# Patient Record
Sex: MALE | Race: WHITE | NOT HISPANIC OR LATINO | URBAN - METROPOLITAN AREA
[De-identification: names, ages, dates, MRNs, and addresses within clinical notes are randomized per-mention and may not be internally consistent; named-entity substitution may affect disease eponyms.]

---

## 2018-11-24 ENCOUNTER — INPATIENT (INPATIENT)
Facility: HOSPITAL | Age: 83
LOS: 5 days | Discharge: NURSING, REHAB OR LONG-TERM CARE | End: 2018-11-30
Attending: INTERNAL MEDICINE | Admitting: INTERNAL MEDICINE
Payer: MEDICARE

## 2018-11-24 PROCEDURE — 99285 EMERGENCY DEPT VISIT HI MDM: CPT

## 2018-11-24 PROCEDURE — 92941 PRQ TRLML REVSC TOT OCCL AMI: CPT | Mod: LD

## 2018-11-24 PROCEDURE — 93458 L HRT ARTERY/VENTRICLE ANGIO: CPT | Mod: 26,XU

## 2018-11-24 PROCEDURE — 71045 X-RAY EXAM CHEST 1 VIEW: CPT | Mod: 26

## 2018-11-25 PROCEDURE — 99223 1ST HOSP IP/OBS HIGH 75: CPT

## 2018-11-26 PROCEDURE — 99232 SBSQ HOSP IP/OBS MODERATE 35: CPT

## 2018-11-27 PROCEDURE — 99232 SBSQ HOSP IP/OBS MODERATE 35: CPT

## 2018-11-28 PROCEDURE — 99233 SBSQ HOSP IP/OBS HIGH 50: CPT

## 2018-11-28 PROCEDURE — 93306 TTE W/DOPPLER COMPLETE: CPT | Mod: 26

## 2018-11-28 PROCEDURE — 71045 X-RAY EXAM CHEST 1 VIEW: CPT | Mod: 26

## 2018-11-29 PROCEDURE — 99232 SBSQ HOSP IP/OBS MODERATE 35: CPT

## 2018-11-30 PROCEDURE — 99232 SBSQ HOSP IP/OBS MODERATE 35: CPT

## 2018-12-05 ENCOUNTER — APPOINTMENT (OUTPATIENT)
Dept: CARDIOLOGY | Facility: CLINIC | Age: 83
End: 2018-12-05
Payer: MEDICARE

## 2018-12-05 ENCOUNTER — NON-APPOINTMENT (OUTPATIENT)
Age: 83
End: 2018-12-05

## 2018-12-05 VITALS — SYSTOLIC BLOOD PRESSURE: 76 MMHG | DIASTOLIC BLOOD PRESSURE: 60 MMHG

## 2018-12-05 VITALS — HEIGHT: 69 IN | BODY MASS INDEX: 26.51 KG/M2 | OXYGEN SATURATION: 93 % | HEART RATE: 51 BPM | WEIGHT: 179 LBS

## 2018-12-05 DIAGNOSIS — G89.29 DORSALGIA, UNSPECIFIED: ICD-10-CM

## 2018-12-05 DIAGNOSIS — I25.2 OLD MYOCARDIAL INFARCTION: ICD-10-CM

## 2018-12-05 DIAGNOSIS — I11.0 HYPERTENSIVE HEART DISEASE WITH HEART FAILURE: ICD-10-CM

## 2018-12-05 DIAGNOSIS — E78.00 PURE HYPERCHOLESTEROLEMIA, UNSPECIFIED: ICD-10-CM

## 2018-12-05 DIAGNOSIS — M62.81 MUSCLE WEAKNESS (GENERALIZED): ICD-10-CM

## 2018-12-05 DIAGNOSIS — Z87.11 PERSONAL HISTORY OF PEPTIC ULCER DISEASE: ICD-10-CM

## 2018-12-05 DIAGNOSIS — K21.9 GASTRO-ESOPHAGEAL REFLUX DISEASE W/OUT ESOPHAGITIS: ICD-10-CM

## 2018-12-05 DIAGNOSIS — M54.9 DORSALGIA, UNSPECIFIED: ICD-10-CM

## 2018-12-05 DIAGNOSIS — Z78.9 OTHER SPECIFIED HEALTH STATUS: ICD-10-CM

## 2018-12-05 DIAGNOSIS — R33.9 RETENTION OF URINE, UNSPECIFIED: ICD-10-CM

## 2018-12-05 DIAGNOSIS — Z86.39 PERSONAL HISTORY OF OTHER ENDOCRINE, NUTRITIONAL AND METABOLIC DISEASE: ICD-10-CM

## 2018-12-05 DIAGNOSIS — I50.23 HYPERTENSIVE HEART DISEASE WITH HEART FAILURE: ICD-10-CM

## 2018-12-05 PROCEDURE — 93000 ELECTROCARDIOGRAM COMPLETE: CPT

## 2018-12-05 PROCEDURE — 99215 OFFICE O/P EST HI 40 MIN: CPT

## 2018-12-05 RX ORDER — TERAZOSIN 5 MG/1
5 CAPSULE ORAL
Refills: 0 | Status: ACTIVE | COMMUNITY

## 2018-12-05 RX ORDER — ASPIRIN 81 MG
81 TABLET, DELAYED RELEASE (ENTERIC COATED) ORAL DAILY
Refills: 0 | Status: ACTIVE | COMMUNITY

## 2018-12-05 RX ORDER — CLOPIDOGREL BISULFATE 75 MG/1
75 TABLET, FILM COATED ORAL
Refills: 0 | Status: ACTIVE | COMMUNITY

## 2018-12-05 NOTE — PHYSICAL EXAM
[General Appearance - Well Developed] : well developed [Normal Appearance] : normal appearance [Well Groomed] : well groomed [General Appearance - Well Nourished] : well nourished [No Deformities] : no deformities [General Appearance - In No Acute Distress] : no acute distress [Normal Conjunctiva] : the conjunctiva exhibited no abnormalities [Eyelids - No Xanthelasma] : the eyelids demonstrated no xanthelasmas [Normal Oral Mucosa] : normal oral mucosa [No Oral Pallor] : no oral pallor [No Oral Cyanosis] : no oral cyanosis [Normal Jugular Venous A Waves Present] : normal jugular venous A waves present [Normal Jugular Venous V Waves Present] : normal jugular venous V waves present [No Jugular Venous Sage A Waves] : no jugular venous sage A waves [Respiration, Rhythm And Depth] : normal respiratory rhythm and effort [Exaggerated Use Of Accessory Muscles For Inspiration] : no accessory muscle use [Auscultation Breath Sounds / Voice Sounds] : lungs were clear to auscultation bilaterally [Heart Sounds] : normal S1 and S2 [Murmurs] : no murmurs present [Irregularly Irregular] : the rhythm was irregularly irregular [Bowel Sounds] : normal bowel sounds [Abdomen Soft] : soft [Abdomen Tenderness] : non-tender [Abdomen Mass (___ Cm)] : no abdominal mass palpated [Abnormal Walk] : normal gait [Gait - Sufficient For Exercise Testing] : the gait was sufficient for exercise testing [Nail Clubbing] : no clubbing of the fingernails [Cyanosis, Localized] : no localized cyanosis [Petechial Hemorrhages (___cm)] : no petechial hemorrhages [] : no ischemic changes [Oriented To Time, Place, And Person] : oriented to person, place, and time [Affect] : the affect was normal [Mood] : the mood was normal [No Anxiety] : not feeling anxious [FreeTextEntry1] : ecchymoses

## 2018-12-05 NOTE — DISCUSSION/SUMMARY
[FreeTextEntry1] : 1) Because of his bradycardia, and hypotension, I reduced his Toprol to 25 mg q.d. and reduced his Lasix to 40 mg every other day.\par 2) I will see him again in  2 weeks but if his bradycardia remains he may require pacemaker

## 2018-12-05 NOTE — REASON FOR VISIT
[Follow-Up - From Hospitalization] : follow-up of a recent hospitalization for [Abnormal ECG] : an abnormal ECG [Cardiomyopathy] : cardiomyopathy [Coronary Artery Disease] : coronary artery disease [Fatigue] : feeling tired (fatigue) [Heart Failure] : congestive heart failure [Hyperlipidemia] : hyperlipidemia [FreeTextEntry1] : I saw this 83-year-old man in followup consultation on  12/05/18\par 2 weeks ago he presented to the hospital with an anterior wall myocardial infarction and had a stent procedure performed.\par He did well post procedure but did develop congestive heart failure in the hospital. He also developed atrial fibrillation.\par He is recovering well in cardiac rehabilitation, but has significant bradycardia and hypotension although he is not aware of this.\par He is asymptomatic

## 2018-12-26 ENCOUNTER — APPOINTMENT (OUTPATIENT)
Dept: CARDIOLOGY | Facility: CLINIC | Age: 83
End: 2018-12-26
Payer: MEDICARE

## 2018-12-26 ENCOUNTER — NON-APPOINTMENT (OUTPATIENT)
Age: 83
End: 2018-12-26

## 2018-12-26 VITALS
BODY MASS INDEX: 26.96 KG/M2 | HEIGHT: 69 IN | OXYGEN SATURATION: 93 % | WEIGHT: 182 LBS | SYSTOLIC BLOOD PRESSURE: 118 MMHG | HEART RATE: 80 BPM | DIASTOLIC BLOOD PRESSURE: 74 MMHG

## 2018-12-26 PROCEDURE — 99215 OFFICE O/P EST HI 40 MIN: CPT

## 2018-12-26 PROCEDURE — 93000 ELECTROCARDIOGRAM COMPLETE: CPT

## 2018-12-26 NOTE — DISCUSSION/SUMMARY
[FreeTextEntry1] : 1) Because of his bradycardia, and hypotension, I reduced his Toprol to 25 mg q.d. and reduced his Lasix to 40 mg 3X /week\par 2) I will see him again in  4 weeks but if his bradycardia remains he may require pacemaker\par 3) He will get an echo and holter monitor

## 2018-12-26 NOTE — REASON FOR VISIT
[Follow-Up - Clinic] : a clinic follow-up of [Abnormal ECG] : an abnormal ECG [Cardiomyopathy] : cardiomyopathy [Coronary Artery Disease] : coronary artery disease [Fatigue] : feeling tired (fatigue) [Heart Failure] : congestive heart failure [Hyperlipidemia] : hyperlipidemia [FreeTextEntry1] : I saw this 83-year-old man in followup consultation on  12/26/18\par 5 weeks ago he presented to the hospital with an anterior wall myocardial infarction and had a stent procedure performed.\par He did well post procedure but did develop congestive heart failure in the hospital. He also developed atrial fibrillation.\par He  recovered well in cardiac rehabilitation and is now home., I had reduced his meds.\par He is asymptomatic but complains of fatigue.\par He is still wearing a Vest

## 2018-12-26 NOTE — HISTORY OF PRESENT ILLNESS
[FreeTextEntry1] : he has no chest pain\par He has some shortness of breath\par He has no palpitations\par He has no syncope\par He is neurologically intact\par He has no edema\par He has no GI symptoms\par

## 2018-12-31 PROCEDURE — 93224 XTRNL ECG REC UP TO 48 HRS: CPT

## 2019-01-04 ENCOUNTER — APPOINTMENT (OUTPATIENT)
Dept: CARDIOLOGY | Facility: CLINIC | Age: 84
End: 2019-01-04
Payer: MEDICARE

## 2019-01-04 PROCEDURE — 93308 TTE F-UP OR LMTD: CPT

## 2019-01-17 ENCOUNTER — APPOINTMENT (OUTPATIENT)
Dept: ELECTROPHYSIOLOGY | Facility: CLINIC | Age: 84
End: 2019-01-17

## 2019-02-05 ENCOUNTER — APPOINTMENT (OUTPATIENT)
Dept: CARDIOLOGY | Facility: CLINIC | Age: 84
End: 2019-02-05
Payer: MEDICARE

## 2019-02-05 VITALS
HEIGHT: 69 IN | BODY MASS INDEX: 26.96 KG/M2 | SYSTOLIC BLOOD PRESSURE: 114 MMHG | OXYGEN SATURATION: 93 % | WEIGHT: 182 LBS | DIASTOLIC BLOOD PRESSURE: 68 MMHG | HEART RATE: 78 BPM

## 2019-02-05 DIAGNOSIS — N40.0 BENIGN PROSTATIC HYPERPLASIA WITHOUT LOWER URINARY TRACT SYMPMS: ICD-10-CM

## 2019-02-05 DIAGNOSIS — I10 ESSENTIAL (PRIMARY) HYPERTENSION: ICD-10-CM

## 2019-02-05 DIAGNOSIS — E78.5 HYPERLIPIDEMIA, UNSPECIFIED: ICD-10-CM

## 2019-02-05 DIAGNOSIS — Z00.00 ENCOUNTER FOR GENERAL ADULT MEDICAL EXAMINATION W/OUT ABNORMAL FINDINGS: ICD-10-CM

## 2019-02-05 PROCEDURE — 99215 OFFICE O/P EST HI 40 MIN: CPT

## 2019-02-05 NOTE — PHYSICAL EXAM
[General Appearance - Well Developed] : well developed [Normal Appearance] : normal appearance [Well Groomed] : well groomed [General Appearance - Well Nourished] : well nourished [No Deformities] : no deformities [General Appearance - In No Acute Distress] : no acute distress [Normal Conjunctiva] : the conjunctiva exhibited no abnormalities [Eyelids - No Xanthelasma] : the eyelids demonstrated no xanthelasmas [Normal Oral Mucosa] : normal oral mucosa [No Oral Pallor] : no oral pallor [No Oral Cyanosis] : no oral cyanosis [JVD Elevated _____cm] : JVD elevated [unfilled] ~U cm above clavicle [Normal Jugular Venous A Waves Present] : normal jugular venous A waves present [Normal Jugular Venous V Waves Present] : normal jugular venous V waves present [No Jugular Venous Sage A Waves] : no jugular venous sage A waves [Respiration, Rhythm And Depth] : normal respiratory rhythm and effort [Exaggerated Use Of Accessory Muscles For Inspiration] : no accessory muscle use [Auscultation Breath Sounds / Voice Sounds] : lungs were clear to auscultation bilaterally [Heart Sounds] : normal S1 and S2 [Murmurs] : no murmurs present [Irregularly Irregular] : the rhythm was irregularly irregular [Bowel Sounds] : normal bowel sounds [Abdomen Soft] : soft [Abdomen Tenderness] : non-tender [Abdomen Mass (___ Cm)] : no abdominal mass palpated [Abnormal Walk] : normal gait [Gait - Sufficient For Exercise Testing] : the gait was sufficient for exercise testing [Nail Clubbing] : no clubbing of the fingernails [Cyanosis, Localized] : no localized cyanosis [Petechial Hemorrhages (___cm)] : no petechial hemorrhages [] : no ischemic changes [Oriented To Time, Place, And Person] : oriented to person, place, and time [Affect] : the affect was normal [Mood] : the mood was normal [No Anxiety] : not feeling anxious [FreeTextEntry1] : ecchymoses

## 2019-02-05 NOTE — REASON FOR VISIT
[Follow-Up - Clinic] : a clinic follow-up of [Abnormal ECG] : an abnormal ECG [Cardiomyopathy] : cardiomyopathy [Coronary Artery Disease] : coronary artery disease [Fatigue] : feeling tired (fatigue) [Heart Failure] : congestive heart failure [Hyperlipidemia] : hyperlipidemia [FreeTextEntry1] : I saw this 83-year-old man in followup consultation on  02/05/19\par 12 weeks ago he presented to the hospital with an anterior wall myocardial infarction and had a stent procedure performed.\par He did well post procedure but did develop congestive heart failure in the hospital. He also developed atrial fibrillation.\par He  recovered well in cardiac rehabilitation and is now home., I had reduced his meds.\par He is asymptomatic but complains of fatigue, and SOB\par He is still wearing a Vest\par Because of urgincy, he missses some diuretic doses.He hasn't taken it for 2 days and is more SOB today.\par His JVP is elevated.

## 2019-02-05 NOTE — DISCUSSION/SUMMARY
[FreeTextEntry1] : 1) Because of his bradycardia, and hypotension, I reduced his Toprol to 25 mg q.d. and reduced his Lasix to 40 mg 3X /week but will increase it now to alt. day.\par 2) I will see him again in  4 weeks  and arrange for an AICD, as his EF is 25% on a recent echo.\par

## 2019-02-22 ENCOUNTER — INPATIENT (INPATIENT)
Facility: HOSPITAL | Age: 84
LOS: 4 days | Discharge: ANOTHER HOSPITAL | End: 2019-02-27
Payer: MEDICARE

## 2019-02-22 PROCEDURE — 74181 MRI ABDOMEN W/O CONTRAST: CPT | Mod: 26

## 2019-02-22 PROCEDURE — 71045 X-RAY EXAM CHEST 1 VIEW: CPT | Mod: 26

## 2019-02-22 PROCEDURE — 93010 ELECTROCARDIOGRAM REPORT: CPT

## 2019-02-22 PROCEDURE — 76705 ECHO EXAM OF ABDOMEN: CPT | Mod: 26

## 2019-02-22 PROCEDURE — 99223 1ST HOSP IP/OBS HIGH 75: CPT

## 2019-02-23 PROCEDURE — 99233 SBSQ HOSP IP/OBS HIGH 50: CPT

## 2019-02-24 PROCEDURE — 99233 SBSQ HOSP IP/OBS HIGH 50: CPT

## 2019-02-24 PROCEDURE — 74176 CT ABD & PELVIS W/O CONTRAST: CPT | Mod: 26

## 2019-02-24 PROCEDURE — 71250 CT THORAX DX C-: CPT | Mod: 26

## 2019-02-25 PROCEDURE — 47490 INCISION OF GALLBLADDER: CPT

## 2019-02-25 PROCEDURE — 78226 HEPATOBILIARY SYSTEM IMAGING: CPT | Mod: 26

## 2019-02-25 PROCEDURE — 71045 X-RAY EXAM CHEST 1 VIEW: CPT | Mod: 26

## 2019-02-25 PROCEDURE — 99233 SBSQ HOSP IP/OBS HIGH 50: CPT

## 2019-02-26 ENCOUNTER — OUTPATIENT (OUTPATIENT)
Dept: OUTPATIENT SERVICES | Facility: HOSPITAL | Age: 84
LOS: 1 days | End: 2019-02-26

## 2019-02-26 PROCEDURE — 99233 SBSQ HOSP IP/OBS HIGH 50: CPT

## 2019-02-26 PROCEDURE — 71045 X-RAY EXAM CHEST 1 VIEW: CPT | Mod: 26

## 2019-02-26 PROCEDURE — 93503 INSERT/PLACE HEART CATHETER: CPT

## 2019-02-27 ENCOUNTER — INPATIENT (INPATIENT)
Facility: HOSPITAL | Age: 84
LOS: 6 days | Discharge: NURSING, REHAB OR LONG-TERM CARE | DRG: 682 | End: 2019-03-06
Attending: INTERNAL MEDICINE | Admitting: INTERNAL MEDICINE
Payer: MEDICARE

## 2019-02-27 ENCOUNTER — OUTPATIENT (OUTPATIENT)
Dept: OUTPATIENT SERVICES | Facility: HOSPITAL | Age: 84
LOS: 1 days | End: 2019-02-27
Payer: MEDICARE

## 2019-02-27 VITALS
RESPIRATION RATE: 17 BRPM | SYSTOLIC BLOOD PRESSURE: 107 MMHG | HEART RATE: 88 BPM | TEMPERATURE: 98 F | DIASTOLIC BLOOD PRESSURE: 76 MMHG | OXYGEN SATURATION: 96 %

## 2019-02-27 DIAGNOSIS — Z90.3 ACQUIRED ABSENCE OF STOMACH [PART OF]: Chronic | ICD-10-CM

## 2019-02-27 DIAGNOSIS — I50.9 HEART FAILURE, UNSPECIFIED: ICD-10-CM

## 2019-02-27 DIAGNOSIS — K81.0 ACUTE CHOLECYSTITIS: ICD-10-CM

## 2019-02-27 DIAGNOSIS — N17.9 ACUTE KIDNEY FAILURE, UNSPECIFIED: ICD-10-CM

## 2019-02-27 DIAGNOSIS — I50.20 UNSPECIFIED SYSTOLIC (CONGESTIVE) HEART FAILURE: ICD-10-CM

## 2019-02-27 DIAGNOSIS — I48.91 UNSPECIFIED ATRIAL FIBRILLATION: ICD-10-CM

## 2019-02-27 DIAGNOSIS — I25.10 ATHEROSCLEROTIC HEART DISEASE OF NATIVE CORONARY ARTERY WITHOUT ANGINA PECTORIS: ICD-10-CM

## 2019-02-27 LAB
ALBUMIN SERPL ELPH-MCNC: 2.9 G/DL — LOW (ref 3.3–5.2)
ALP SERPL-CCNC: 44 U/L — SIGNIFICANT CHANGE UP (ref 40–120)
ALT FLD-CCNC: 12 U/L — SIGNIFICANT CHANGE UP
ANION GAP SERPL CALC-SCNC: 10 MMOL/L — SIGNIFICANT CHANGE UP (ref 5–17)
APPEARANCE UR: ABNORMAL
APTT BLD: 36 SEC — SIGNIFICANT CHANGE UP (ref 27.5–36.3)
AST SERPL-CCNC: 18 U/L — SIGNIFICANT CHANGE UP
BACTERIA # UR AUTO: ABNORMAL
BASOPHILS # BLD AUTO: 0 K/UL — SIGNIFICANT CHANGE UP (ref 0–0.2)
BASOPHILS NFR BLD AUTO: 0.2 % — SIGNIFICANT CHANGE UP (ref 0–2)
BILIRUB SERPL-MCNC: 1.3 MG/DL — SIGNIFICANT CHANGE UP (ref 0.4–2)
BILIRUB UR-MCNC: NEGATIVE — SIGNIFICANT CHANGE UP
BUN SERPL-MCNC: 56 MG/DL — HIGH (ref 8–20)
CALCIUM SERPL-MCNC: 8.1 MG/DL — LOW (ref 8.6–10.2)
CHLORIDE SERPL-SCNC: 96 MMOL/L — LOW (ref 98–107)
CO2 SERPL-SCNC: 30 MMOL/L — HIGH (ref 22–29)
COLOR SPEC: ABNORMAL
COMMENT - URINE: SIGNIFICANT CHANGE UP
CREAT ?TM UR-MCNC: 71 MG/DL — SIGNIFICANT CHANGE UP
CREAT SERPL-MCNC: 2.93 MG/DL — HIGH (ref 0.5–1.3)
DIFF PNL FLD: ABNORMAL
EOSINOPHIL # BLD AUTO: 0.1 K/UL — SIGNIFICANT CHANGE UP (ref 0–0.5)
EOSINOPHIL NFR BLD AUTO: 0.9 % — SIGNIFICANT CHANGE UP (ref 0–6)
EPI CELLS # UR: SIGNIFICANT CHANGE UP
GLUCOSE SERPL-MCNC: 114 MG/DL — SIGNIFICANT CHANGE UP (ref 70–115)
GLUCOSE UR QL: NEGATIVE MG/DL — SIGNIFICANT CHANGE UP
HCT VFR BLD CALC: 29.4 % — LOW (ref 37–47)
HGB BLD-MCNC: 9.2 G/DL — LOW (ref 12–16)
INR BLD: 1.47 RATIO — HIGH (ref 0.88–1.16)
KETONES UR-MCNC: NEGATIVE — SIGNIFICANT CHANGE UP
LACTATE SERPL-SCNC: 0.8 MMOL/L — SIGNIFICANT CHANGE UP (ref 0.5–2)
LEUKOCYTE ESTERASE UR-ACNC: ABNORMAL
LYMPHOCYTES # BLD AUTO: 0.6 K/UL — LOW (ref 1–4.8)
LYMPHOCYTES # BLD AUTO: 9.8 % — LOW (ref 20–55)
MAGNESIUM SERPL-MCNC: 2.6 MG/DL — SIGNIFICANT CHANGE UP (ref 1.8–2.6)
MCHC RBC-ENTMCNC: 31.3 G/DL — LOW (ref 32–36)
MCHC RBC-ENTMCNC: 31.3 PG — HIGH (ref 27–31)
MCV RBC AUTO: 100 FL — HIGH (ref 81–99)
MONOCYTES # BLD AUTO: 0.4 K/UL — SIGNIFICANT CHANGE UP (ref 0–0.8)
MONOCYTES NFR BLD AUTO: 6.9 % — SIGNIFICANT CHANGE UP (ref 3–10)
NEUTROPHILS # BLD AUTO: 5.3 K/UL — SIGNIFICANT CHANGE UP (ref 1.8–8)
NEUTROPHILS NFR BLD AUTO: 81.9 % — HIGH (ref 37–73)
NITRITE UR-MCNC: POSITIVE
NT-PROBNP SERPL-SCNC: HIGH PG/ML (ref 0–300)
OSMOLALITY UR: 248 MOSM/KG — LOW (ref 300–1000)
PH UR: 6 — SIGNIFICANT CHANGE UP (ref 5–8)
PHOSPHATE SERPL-MCNC: 4.4 MG/DL — SIGNIFICANT CHANGE UP (ref 2.4–4.7)
PLATELET # BLD AUTO: 148 K/UL — LOW (ref 150–400)
POTASSIUM SERPL-MCNC: 4 MMOL/L — SIGNIFICANT CHANGE UP (ref 3.5–5.3)
POTASSIUM SERPL-SCNC: 4 MMOL/L — SIGNIFICANT CHANGE UP (ref 3.5–5.3)
PROT ?TM UR-MCNC: 233 MG/DL — HIGH (ref 0–12)
PROT SERPL-MCNC: 5.6 G/DL — LOW (ref 6.6–8.7)
PROT UR-MCNC: 500 MG/DL
PROT/CREAT UR-RTO: 3.3 RATIO — HIGH
PROTHROM AB SERPL-ACNC: 17.1 SEC — HIGH (ref 10–12.9)
RBC # BLD: 2.94 M/UL — LOW (ref 4.4–5.2)
RBC # FLD: 15.7 % — HIGH (ref 11–15.6)
RBC CASTS # UR COMP ASSIST: >50 /HPF (ref 0–4)
SODIUM SERPL-SCNC: 136 MMOL/L — SIGNIFICANT CHANGE UP (ref 135–145)
SODIUM UR-SCNC: <30 MMOL/L — SIGNIFICANT CHANGE UP
SP GR SPEC: 1.01 — SIGNIFICANT CHANGE UP (ref 1.01–1.02)
TROPONIN T SERPL-MCNC: 0.06 NG/ML — SIGNIFICANT CHANGE UP (ref 0–0.06)
UROBILINOGEN FLD QL: 1 MG/DL
WBC # BLD: 6.5 K/UL — SIGNIFICANT CHANGE UP (ref 4.8–10.8)
WBC # FLD AUTO: 6.5 K/UL — SIGNIFICANT CHANGE UP (ref 4.8–10.8)
WBC UR QL: ABNORMAL

## 2019-02-27 PROCEDURE — 99223 1ST HOSP IP/OBS HIGH 75: CPT

## 2019-02-27 PROCEDURE — 76775 US EXAM ABDO BACK WALL LIM: CPT | Mod: 26

## 2019-02-27 PROCEDURE — 71045 X-RAY EXAM CHEST 1 VIEW: CPT | Mod: 26

## 2019-02-27 PROCEDURE — 93306 TTE W/DOPPLER COMPLETE: CPT | Mod: 26

## 2019-02-27 PROCEDURE — 71045 X-RAY EXAM CHEST 1 VIEW: CPT | Mod: 26,77

## 2019-02-27 PROCEDURE — 52000 CYSTOURETHROSCOPY: CPT

## 2019-02-27 PROCEDURE — 93010 ELECTROCARDIOGRAM REPORT: CPT

## 2019-02-27 RX ORDER — PIPERACILLIN AND TAZOBACTAM 4; .5 G/20ML; G/20ML
3.38 INJECTION, POWDER, LYOPHILIZED, FOR SOLUTION INTRAVENOUS EVERY 12 HOURS
Qty: 0 | Refills: 0 | Status: DISCONTINUED | OUTPATIENT
Start: 2019-02-27 | End: 2019-03-05

## 2019-02-27 RX ORDER — SODIUM CHLORIDE 9 MG/ML
250 INJECTION, SOLUTION INTRAVENOUS ONCE
Qty: 0 | Refills: 0 | Status: COMPLETED | OUTPATIENT
Start: 2019-02-27 | End: 2019-02-27

## 2019-02-27 RX ORDER — HEPARIN SODIUM 5000 [USP'U]/ML
5000 INJECTION INTRAVENOUS; SUBCUTANEOUS EVERY 12 HOURS
Qty: 0 | Refills: 0 | Status: DISCONTINUED | OUTPATIENT
Start: 2019-02-27 | End: 2019-02-28

## 2019-02-27 RX ORDER — SODIUM CHLORIDE 9 MG/ML
1000 INJECTION, SOLUTION INTRAVENOUS
Qty: 0 | Refills: 0 | Status: DISCONTINUED | OUTPATIENT
Start: 2019-02-27 | End: 2019-03-06

## 2019-02-27 RX ORDER — ATORVASTATIN CALCIUM 80 MG/1
80 TABLET, FILM COATED ORAL AT BEDTIME
Qty: 0 | Refills: 0 | Status: DISCONTINUED | OUTPATIENT
Start: 2019-02-27 | End: 2019-03-06

## 2019-02-27 RX ORDER — CLOPIDOGREL BISULFATE 75 MG/1
75 TABLET, FILM COATED ORAL DAILY
Qty: 0 | Refills: 0 | Status: DISCONTINUED | OUTPATIENT
Start: 2019-02-27 | End: 2019-03-06

## 2019-02-27 RX ORDER — METOPROLOL TARTRATE 50 MG
25 TABLET ORAL
Qty: 0 | Refills: 0 | Status: DISCONTINUED | OUTPATIENT
Start: 2019-02-27 | End: 2019-03-01

## 2019-02-27 RX ORDER — ASPIRIN/CALCIUM CARB/MAGNESIUM 324 MG
81 TABLET ORAL DAILY
Qty: 0 | Refills: 0 | Status: DISCONTINUED | OUTPATIENT
Start: 2019-02-27 | End: 2019-02-28

## 2019-02-27 RX ORDER — CITALOPRAM 10 MG/1
10 TABLET, FILM COATED ORAL DAILY
Qty: 0 | Refills: 0 | Status: DISCONTINUED | OUTPATIENT
Start: 2019-02-27 | End: 2019-03-06

## 2019-02-27 RX ADMIN — CLOPIDOGREL BISULFATE 75 MILLIGRAM(S): 75 TABLET, FILM COATED ORAL at 18:04

## 2019-02-27 RX ADMIN — Medication 25 MILLIGRAM(S): at 18:04

## 2019-02-27 RX ADMIN — SODIUM CHLORIDE 500 MILLILITER(S): 9 INJECTION, SOLUTION INTRAVENOUS at 17:36

## 2019-02-27 RX ADMIN — SODIUM CHLORIDE 75 MILLILITER(S): 9 INJECTION, SOLUTION INTRAVENOUS at 18:10

## 2019-02-27 RX ADMIN — HEPARIN SODIUM 5000 UNIT(S): 5000 INJECTION INTRAVENOUS; SUBCUTANEOUS at 18:04

## 2019-02-27 RX ADMIN — ATORVASTATIN CALCIUM 80 MILLIGRAM(S): 80 TABLET, FILM COATED ORAL at 23:44

## 2019-02-27 RX ADMIN — Medication 81 MILLIGRAM(S): at 18:04

## 2019-02-27 RX ADMIN — PIPERACILLIN AND TAZOBACTAM 25 GRAM(S): 4; .5 INJECTION, POWDER, LYOPHILIZED, FOR SOLUTION INTRAVENOUS at 18:04

## 2019-02-27 NOTE — H&P ADULT - HISTORY OF PRESENT ILLNESS
85 yo male with hx of afib on eliquis, CAD s/p PCI to LAD in Nov 2018, CHFrEF, life vest, BPH, HTN, HLD, transferred to [facility] from [facility] for acute cholecystitis.  HIDA showed probable acute cholecystis and he was deemed a poor surgical candidate so he underwent percutaneous cholecystostomy tube placement on 2/25.  He had a brief episode of hypotension requiring dopamine, which precipitated afib with RVR, and the dopamine was subsequently discontinued.  Course complicated by oliguric renal failure.  [place] dante catheter placed on 2/26.  Transferred to [facility] on 2/27 due to worsening renal failure with anticipated need for CVVHD.      Prior to transfer on 2/27/19: PAP 55/24, CO 5.21, CI 2.67. 83 yo male with hx of afib on eliquis, CAD s/p PCI to LAD in Nov 2018, CHFrEF, life vest, BPH, HTN, HLD, transferred to [facility] from [facility] for acute cholecystitis.  HIDA showed probable acute cholecystis and he was deemed a poor surgical candidate so he underwent percutaneous cholecystostomy tube placement on 2/25.  He had a brief episode of hypotension requiring dopamine, which precipitated afib with RVR, and the dopamine was subsequently discontinued.  Course complicated by oliguric renal failure.  [employer] dante catheter placed on 2/26.  Transferred to [facility] on 2/27 due to worsening renal failure with anticipated need for CVVHD.      Prior to transfer on 2/27/19: PAP 55/24, CO 5.21, CI 2.67.

## 2019-02-27 NOTE — BRIEF OPERATIVE NOTE - PROCEDURE
<<-----Click on this checkbox to enter Procedure Cystoscopy  02/27/2019  whitehead placement  Active  [email or web address]

## 2019-02-27 NOTE — PROGRESS NOTE ADULT - SUBJECTIVE AND OBJECTIVE BOX
83 yo male with hx of  AF -  on Eliquis,  CAD s/p PCI to LAD in Nov 2018, CHFrEF, life vest, BPH, HTN  transferred to [facility] from [facility] for acute cholecystitis.  HIDA showed probable acute cholecystis and he was deemed a poor surgical candidate so he underwent percutaneous cholecystostomy tube placement on 2/25.      He had a brief episode of hypotension requiring dopamine, which precipitated AF with RVR, and the dopamine was subsequently discontinued.      Course complicated by oliguric renal failure.  S-G  catheter placed on 2/26.      Transferred to [facility] on 2/27 due to worsening renal failure with anticipated need for  CRRT,      Prior to transfer on 2/27/19: PAP 55/24, CO 5.21, CI 2.67.            Allergies:  	No Known Drug Allergies:       Home Medications:     · 	Eliquis 5 mg oral tablet: 1 tab(s) orally 2 times a day  · 	Aspirin Low Dose 81 mg oral tablet, chewable: 1 tab(s) orally once a day  · 	atorvastatin 80 mg oral tablet: 1 tab(s) orally once a day  · 	citalopram 10 mg oral tablet: 1 tab(s) orally once a day  · 	clopidogrel 75 mg oral tablet: 1 tab(s) orally once a day  · 	furosemide 40 mg oral tablet: 1 tab(s) orally once a day  · 	lisinopril 10 mg oral tablet: 1 tab(s) orally once a day  · 	metoprolol tartrate 25 mg oral tablet: 1 tab(s) orally 2 times a day  · 	terazosin 5 mg oral tablet: 1 tab(s) orally once a day    .    Patient History:     Atrial fibrillation    CAD (coronary artery disease)  s/p PCI to LAD in Nov 2018  Systolic CHF.     Past Surgical History:    History of partial gastrectomy  for duodenal ulcer.

## 2019-02-27 NOTE — H&P ADULT - RS GEN PE MLT RESP DETAILS PC
crackles at posterior bases/airway patent/respirations non-labored/breath sounds equal/good air movement

## 2019-02-27 NOTE — BRIEF OPERATIVE NOTE - PRE-OP DX
Chronic kidney disease, unspecified CKD stage  02/27/2019    Active  Israel Maguire  Problem with Garcia catheter, initial encounter  02/27/2019    Active  Israel Maguire

## 2019-02-27 NOTE — H&P ADULT - ASSESSMENT
85 yo male with hx of afib on eliquis, CAD s/p PCI to LAD in Nov 2018, CHFrEF, life vest, BPH, HTN, HLD, [place] [facility] [place]ole[place]stitis underwent per cholecystostomy 2/25, transferred to [facility] MICU due to oliguric TONY with possible need for CVVH

## 2019-02-27 NOTE — H&P ADULT - NSHPLABSRESULTS_GEN_ALL_CORE
9.2    6.5   )-----------( 148      ( 27 Feb 2019 16:17 )             29.4   02-27    136  |  96<L>  |  56.0<H>  ----------------------------<  114  4.0   |  30.0<H>  |  2.93<H>    Ca    8.1<L>      27 Feb 2019 16:17  Phos  4.4     02-27  Mg     2.6     02-27    TPro  5.6<L>  /  Alb  2.9<L>  /  TBili  1.3  /  DBili  x   /  AST  18  /  ALT  12  /  AlkPhos  44  02-27    CXR- bibasilar airspace opacities, PA catheter in position

## 2019-02-27 NOTE — H&P ADULT - PROBLEM SELECTOR PLAN 1
No urgent need for HD at this time.  Seems somewhat dry intravascularly, PCWP 17.  Will try gentle hydration, discussed with nephrology

## 2019-02-27 NOTE — CONSULT NOTE ADULT - SUBJECTIVE AND OBJECTIVE BOX
[facility] DIVISION OF KIDNEY DISEASES AND HYPERTENSION -- INITIAL CONSULT NOTE  --------------------------------------------------------------------------------  HPI: Patient was Transferred from [facility] , for TONY,    Low EF,    PAST HISTORY  --------------------------------------------------------------------------------  PAST MEDICAL & SURGICAL HISTORY:    FAMILY HISTORY:    PAST SOCIAL HISTORY:    ALLERGIES & MEDICATIONS  --------------------------------------------------------------------------------  Allergies    eggs (Other)  No Known Drug Allergies    Standing Inpatient Medications  aspirin  chewable 81 milliGRAM(s) Oral daily  atorvastatin 80 milliGRAM(s) Oral at bedtime  citalopram 10 milliGRAM(s) Oral daily  clopidogrel Tablet 75 milliGRAM(s) Oral daily  heparin  Injectable 5000 Unit(s) SubCutaneous every 12 hours  metoprolol tartrate 25 milliGRAM(s) Oral two times a day    REVIEW OF SYSTEMS  --------------------------------------------------------------------------------  Gen: No weight changes, fatigue, fevers/chills, weakness  Skin: No rashes  Head/Eyes/Ears/Mouth: No headache; Normal hearing; Normal vision w/o blurriness;   Respiratory: No dyspnea, cough, wheezing, hemoptysis  CV: No chest pain, PND, orthopnea  GI: No abdominal pain, diarrhea, constipation, nausea, vomiting, melena, hematochezia  :  + Garcia,   MSK: No joint pain/swelling; no back pain; no edema  Neuro: No dizziness/lightheadedness, weakness, seizures, numbness, tingling  Heme: No easy bruising or bleeding  Endo: No heat/cold intolerance  Psych: No significant nervousness, anxiety, stress, depression    All other systems were reviewed and are negative, except as noted.    VITALS/PHYSICAL EXAM  --------------------------------------------------------------------------------  T(C): --  HR: --  BP: --  RR: --  SpO2: --  Wt(kg): --    Physical Exam:  	Gen: NAD, well-appearing , Pale,  	HEENT: PERRL, supple neck,   	Pulm: Decreased BS  B/L  	CV: AF,  S1S2; no rub  	Back: No spinal or CVA tenderness; no sacral edema  	Abd: +BS, soft, nontender/nondistended  	: No suprapubic tenderness  	UE: Warm, FROM, no clubbing, intact strength; no edema; no asterixis  	LE: Warm, FROM, no clubbing, intact strength; no edema  	Neuro: No focal deficits,   	Psych: Normal affect and mood  	Skin: Warm, without rashes  	Vascular access:    S-G  Measurements - Unavailable,     LABS/STUDIES : P  --------------------------------------------------------------------------------    Creatinine Trend:    Oliguric, Bladder Not distended,

## 2019-02-28 DIAGNOSIS — I48.91 UNSPECIFIED ATRIAL FIBRILLATION: ICD-10-CM

## 2019-02-28 DIAGNOSIS — I50.22 CHRONIC SYSTOLIC (CONGESTIVE) HEART FAILURE: ICD-10-CM

## 2019-02-28 LAB
ALBUMIN SERPL ELPH-MCNC: 3.1 G/DL — LOW (ref 3.3–5.2)
ALP SERPL-CCNC: 48 U/L — SIGNIFICANT CHANGE UP (ref 40–120)
ALT FLD-CCNC: 14 U/L — SIGNIFICANT CHANGE UP
ANION GAP SERPL CALC-SCNC: 11 MMOL/L — SIGNIFICANT CHANGE UP (ref 5–17)
ANION GAP SERPL CALC-SCNC: 11 MMOL/L — SIGNIFICANT CHANGE UP (ref 5–17)
AST SERPL-CCNC: 20 U/L — SIGNIFICANT CHANGE UP
BILIRUB SERPL-MCNC: 1.2 MG/DL — SIGNIFICANT CHANGE UP (ref 0.4–2)
BUN SERPL-MCNC: 56 MG/DL — HIGH (ref 8–20)
BUN SERPL-MCNC: 57 MG/DL — HIGH (ref 8–20)
CALCIUM SERPL-MCNC: 8.5 MG/DL — LOW (ref 8.6–10.2)
CALCIUM SERPL-MCNC: 8.5 MG/DL — LOW (ref 8.6–10.2)
CHLORIDE SERPL-SCNC: 97 MMOL/L — LOW (ref 98–107)
CHLORIDE SERPL-SCNC: 98 MMOL/L — SIGNIFICANT CHANGE UP (ref 98–107)
CO2 SERPL-SCNC: 29 MMOL/L — SIGNIFICANT CHANGE UP (ref 22–29)
CO2 SERPL-SCNC: 29 MMOL/L — SIGNIFICANT CHANGE UP (ref 22–29)
CREAT SERPL-MCNC: 2.84 MG/DL — HIGH (ref 0.5–1.3)
CREAT SERPL-MCNC: 2.94 MG/DL — HIGH (ref 0.5–1.3)
GLUCOSE SERPL-MCNC: 105 MG/DL — SIGNIFICANT CHANGE UP (ref 70–115)
GLUCOSE SERPL-MCNC: 110 MG/DL — SIGNIFICANT CHANGE UP (ref 70–115)
HCT VFR BLD CALC: 31.7 % — LOW (ref 37–47)
HGB BLD-MCNC: 9.9 G/DL — LOW (ref 12–16)
MAGNESIUM SERPL-MCNC: 2.7 MG/DL — HIGH (ref 1.6–2.6)
MAGNESIUM SERPL-MCNC: 2.7 MG/DL — HIGH (ref 1.6–2.6)
MCHC RBC-ENTMCNC: 31.2 G/DL — LOW (ref 32–36)
MCHC RBC-ENTMCNC: 31.2 PG — HIGH (ref 27–31)
MCV RBC AUTO: 100 FL — HIGH (ref 81–99)
PHOSPHATE SERPL-MCNC: 3.9 MG/DL — SIGNIFICANT CHANGE UP (ref 2.4–4.7)
PHOSPHATE SERPL-MCNC: 4.3 MG/DL — SIGNIFICANT CHANGE UP (ref 2.4–4.7)
PLATELET # BLD AUTO: 156 K/UL — SIGNIFICANT CHANGE UP (ref 150–400)
POTASSIUM SERPL-MCNC: 4.2 MMOL/L — SIGNIFICANT CHANGE UP (ref 3.5–5.3)
POTASSIUM SERPL-MCNC: 4.3 MMOL/L — SIGNIFICANT CHANGE UP (ref 3.5–5.3)
POTASSIUM SERPL-SCNC: 4.2 MMOL/L — SIGNIFICANT CHANGE UP (ref 3.5–5.3)
POTASSIUM SERPL-SCNC: 4.3 MMOL/L — SIGNIFICANT CHANGE UP (ref 3.5–5.3)
PROT SERPL-MCNC: 5.8 G/DL — LOW (ref 6.6–8.7)
RBC # BLD: 3.17 M/UL — LOW (ref 4.4–5.2)
RBC # FLD: 15.7 % — HIGH (ref 11–15.6)
SODIUM SERPL-SCNC: 137 MMOL/L — SIGNIFICANT CHANGE UP (ref 135–145)
SODIUM SERPL-SCNC: 138 MMOL/L — SIGNIFICANT CHANGE UP (ref 135–145)
WBC # BLD: 6.7 K/UL — SIGNIFICANT CHANGE UP (ref 4.8–10.8)
WBC # FLD AUTO: 6.7 K/UL — SIGNIFICANT CHANGE UP (ref 4.8–10.8)

## 2019-02-28 PROCEDURE — 99223 1ST HOSP IP/OBS HIGH 75: CPT

## 2019-02-28 PROCEDURE — 99233 SBSQ HOSP IP/OBS HIGH 50: CPT

## 2019-02-28 PROCEDURE — 99231 SBSQ HOSP IP/OBS SF/LOW 25: CPT

## 2019-02-28 PROCEDURE — 93010 ELECTROCARDIOGRAM REPORT: CPT

## 2019-02-28 RX ORDER — SODIUM CHLORIDE 9 MG/ML
1000 INJECTION INTRAMUSCULAR; INTRAVENOUS; SUBCUTANEOUS
Qty: 0 | Refills: 0 | Status: DISCONTINUED | OUTPATIENT
Start: 2019-02-28 | End: 2019-02-28

## 2019-02-28 RX ORDER — APIXABAN 2.5 MG/1
2.5 TABLET, FILM COATED ORAL EVERY 12 HOURS
Qty: 0 | Refills: 0 | Status: DISCONTINUED | OUTPATIENT
Start: 2019-02-28 | End: 2019-03-06

## 2019-02-28 RX ORDER — ACETAMINOPHEN 500 MG
1000 TABLET ORAL ONCE
Qty: 0 | Refills: 0 | Status: COMPLETED | OUTPATIENT
Start: 2019-02-28 | End: 2019-02-28

## 2019-02-28 RX ADMIN — PIPERACILLIN AND TAZOBACTAM 25 GRAM(S): 4; .5 INJECTION, POWDER, LYOPHILIZED, FOR SOLUTION INTRAVENOUS at 17:30

## 2019-02-28 RX ADMIN — Medication 400 MILLIGRAM(S): at 04:07

## 2019-02-28 RX ADMIN — APIXABAN 2.5 MILLIGRAM(S): 2.5 TABLET, FILM COATED ORAL at 22:17

## 2019-02-28 RX ADMIN — ATORVASTATIN CALCIUM 80 MILLIGRAM(S): 80 TABLET, FILM COATED ORAL at 22:19

## 2019-02-28 RX ADMIN — Medication 1000 MILLIGRAM(S): at 05:15

## 2019-02-28 RX ADMIN — CLOPIDOGREL BISULFATE 75 MILLIGRAM(S): 75 TABLET, FILM COATED ORAL at 12:11

## 2019-02-28 RX ADMIN — Medication 25 MILLIGRAM(S): at 17:30

## 2019-02-28 RX ADMIN — Medication 25 MILLIGRAM(S): at 05:28

## 2019-02-28 RX ADMIN — SODIUM CHLORIDE 20 MILLILITER(S): 9 INJECTION INTRAMUSCULAR; INTRAVENOUS; SUBCUTANEOUS at 06:11

## 2019-02-28 RX ADMIN — PIPERACILLIN AND TAZOBACTAM 25 GRAM(S): 4; .5 INJECTION, POWDER, LYOPHILIZED, FOR SOLUTION INTRAVENOUS at 05:29

## 2019-02-28 RX ADMIN — Medication 81 MILLIGRAM(S): at 12:11

## 2019-02-28 RX ADMIN — HEPARIN SODIUM 5000 UNIT(S): 5000 INJECTION INTRAVENOUS; SUBCUTANEOUS at 05:28

## 2019-02-28 RX ADMIN — CITALOPRAM 10 MILLIGRAM(S): 10 TABLET, FILM COATED ORAL at 12:11

## 2019-02-28 NOTE — CONSULT NOTE ADULT - SUBJECTIVE AND OBJECTIVE BOX
[facility] [place] MARY BLACK MD FACP   MEAGAN AMEZQUITA MD  Diplomates American Board of Internal Medicine and Infecctious Diseases  [phone]  [id number] NAPOLEON [id number]      HPI:  83 yo male with hx of afib on eliquis, CAD s/p PCI to LAD in 2018, CHFrEF, life vest, BPH, HTN, HLD, transferred to [facility] from [facility] for acute cholecystitis.  HIDA showed probable acute cholecystis and he was deemed a poor surgical candidate so he underwent percutaneous cholecystostomy tube placement on .  He had a brief episode of hypotension requiring dopamine, which precipitated afib with RVR, and the dopamine was subsequently discontinued.  Course complicated by oliguric renal failure.  [place] dante catheter placed on .  Transferred to [facility] on  due to worsening renal failure with anticipated need for CVVHD.    .WAS IN ICU HERE AND NOW TRANSFERRED TO Telemetry  ASKED TO EVALUATE FROM ID STANDPOINT            PAST MEDICAL & SURGICAL HISTORY:  Atrial fibrillation  Systolic CHF  CAD (coronary artery disease): s/p PCI to LAD in 2018  History of partial gastrectomy: for duodenal ulcer      ANTIBIOTICS  piperacillin/tazobactam IVPB. 3.375 Gram(s) IV Intermittent every 12 hours      Allergies    eggs (Other)  No Known Drug Allergies    Intolerances        SOCIAL HISTORY:       FAMILY HX   FAMILY HISTORY:  No pertinent family history in first degree relatives      Vital Signs Last 24 Hrs  T(C): 36.3 (2019 16:00), Max: 36.8 (2019 23:19)  T(F): 97.4 (2019 16:00), Max: 98.3 (2019 23:19)  HR: 78 (2019 16:00) (69 - 96)  BP: 126/79 (2019 16:00) (100/66 - 130/79)  BP(mean): 94 (2019 11:00) (75 - 101)  RR: 18 (2019 16:00) (13 - 38)  SpO2: 95% (2019 16:00) (90% - 100%)  Drug Dosing Weight  Height (cm): 154.68 (2019 16:00)  Weight (kg): 79.3 (2019 16:00)  BMI (kg/m2): 33.1 (2019 16:00)  BSA (m2): 1.78 (2019 16:00)      REVIEW OF SYSTEMS:    CONSTITUTIONAL:  As per HPI.  PT LETHARGIC UNABLE TO OBTAIN ROS                        PHYSICAL EXAMINATION:    GENERAL: The patient is a well-developed, well-nourished ___IN NAD  LETHARGIC    HEENT: Head is normocephalic and atraumatic.  ANICTERIC  NECK: Supple. No carotid bruits.  No lymphadenopathy or thyromegaly.    LUNGS:COARSE BREATH SOUNDS    HEART: Regular rate and rhythm without murmur.    ABDOMEN: Soft, nontender, and nondistended.  Positive bowel sounds.  No hepatosplenomegaly was noted. NO REBOUND NO GUARDING    EXTREMITIES: NO EDEMA NO ERYTHEMA    NEUROLOGIC: LETHARGIC ANSWERS  YES OR NO       SKIN: No ulceration or induration present. NO RASH        BLOOD CULTURES       URINE CX          LABS:                        9.9    6.7   )-----------( 156      ( 2019 05:52 )             31.7     02-    137  |  97<L>  |  57.0<H>  ----------------------------<  105  4.2   |  29.0  |  2.84<H>    Ca    8.5<L>      2019 05:52  Phos  4.3       Mg     2.7         TPro  5.8<L>  /  Alb  3.1<L>  /  TBili  1.2  /  DBili  x   /  AST  20  /  ALT  14  /  AlkPhos  48  -28    PT/INR - ( 2019 16:17 )   PT: 17.1 sec;   INR: 1.47 ratio         PTT - ( 2019 16:17 )  PTT:36.0 sec  Urinalysis Basic - ( 2019 16:18 )    Color: Brown / Appearance: Slightly Turbid / S.015 / pH: x  Gluc: x / Ketone: Negative  / Bili: Negative / Urobili: 1 mg/dL   Blood: x / Protein: 500 mg/dL / Nitrite: Positive   Leuk Esterase: Moderate / RBC: >50 /HPF / WBC 11-25   Sq Epi: x / Non Sq Epi: Occasional / Bacteria: Few        RADIOLOGY & ADDITIONAL STUDIES:      ASSESSMENT/PLAN    83 yo male with hx of afib on eliquis, CAD s/p PCI to LAD in 2018, CHFrEF, life vest, BPH, HTN, HLD, transferred to [facility] from [facility] for acute cholecystitis.  HIDA showed probable acute cholecystis and he was deemed a poor surgical candidate so he underwent percutaneous cholecystostomy tube placement on .  He had a brief episode of hypotension requiring dopamine, which precipitated afib with RVR, and the dopamine was subsequently discontinued.  Course complicated by oliguric renal failure.  [place] dante catheter placed on .  Transferred to [facility] on  due to worsening renal failure with anticipated need for CVVHD.     WAS IN ICU HERE AND NOW TRANSFERRED TO Telemetry  LETHARGIC BUT ANSWERS SIMPLE YES OR NO QUESTIONS   BODY FLUID CX GM NEG RODS   BLOOD CX PENDING   WILL RECOMMEND CONTINUE CURRENT REGIMEN ZOSYN TO COVER INTRAABDOMINAL PATHOGENS  WILL FOLLOW UP WITH FURTHER  RECOMMENDATIONS          MEAGAN KAHN MD

## 2019-02-28 NOTE — CONSULT NOTE ADULT - ATTENDING COMMENTS
Seen and examined.  Details per resident H+P.  Asked to evaluate patient's perc cholecystotomy tube.  Octagenarian w/ multiple medical problems including significant cardiac history, wears life vest initially presented to and managed at [facility] 2/2 RUQ pain and tenderness and a HIDA scan positive for acute cholecystitis.  Because he was deemed a non-surgical candidate an IR guided perc makayla tube was placed.  Patient was transferred to [facility]U 2/2 worsening renal function and concerns for need for RRT.  Patient has since recovered.  States no abdominal pain.  Awaiting swallow study prior to receiving PO.  Afebrile.  HD normal.  No leukocytosis.  LFTs normal.  Imaging from [facility] reviewed - sludge in gallbladder; HIDA+.  Sitting in a chair.  NAD.  GI: Abdomen is S/NT/ND, well-healed midline scar, IR perc makayla makayla tube in place, no erythema, draining bile.  Resolved acute cholecystitis.  Continue IR drain to bile bag and keep in place for 6-8 weeks followed by tube study.  May f/u w/ surgeon at [facility] which is closer to his home.  Spoke w/ daughter, who is at bedside, about above.

## 2019-02-28 NOTE — PHYSICAL THERAPY INITIAL EVALUATION ADULT - ADDITIONAL COMMENTS
Pt lives alone in a private home. 3 steps to enter with handrails, 3 steps inside with handrails. Pt states he was independent PTA without assist device. Per daughter at bedside, pt has an aide that lives in his home however she does not provide 24/7 care as she has other jobs. Pt owns RW and SAC.

## 2019-02-28 NOTE — PROGRESS NOTE ADULT - ASSESSMENT
83 yo male PMH Afib (home on eliquis), CAD s/p PCI to LAD (Nov 2018), CHFrEF (EF 20-25%), life vest, BPH, HTN, HLD, [facility] for acute cholecystitis.  HIDA showed probable acute cholecystitis and he was deemed a poor surgical candidate so he underwent percutaneous cholecystostomy tube placement on 2/25.  He had a brief episode of hypotension requiring dopamine, which precipitated afib with RVR, and the dopamine was subsequently discontinued.  Course complicated by oliguric renal failure.  [place] dante catheter placed on 2/26.  Transferred to [facility] on 2/27 due to worsening renal failure with anticipated need for CVVHD.   Schwanz dante catheter removed;  cholecystosomy tube still draining;   kidney functioning improving, patient downgraded to medical service     >acute Cholecystitis :  -s/p Cholecystostomy tube placed on 2/25/2019;  today is POD#4;    -patient is afebrile, wbcs nl     -as per surgery No further acute surgical interventions needed, tube will be in place for 6-8 weeks.  Draining 200cc bile in last 24hours;    -plan of care discussed with patient and daughter (Luana);  fully aware that he needs to follow up with Surgeon  [facility] where tube placed;     -surgery signed off case;  -c/w abxs   -Id consulted by icu team     >TONY (acute kidney injury)/ patient was transferred for possible need for cvvhd, renal function improving;    -patient given 250cc bolus fluid, then palced on Plasmalyte maintenance fluid on board  -monitor renal function -strict i's and o's;    -nephro on board   - original whitehead placed before transfer; subsequent renal US at [facility] showed misplaced whitehead;   whitehead catheter removed, subsequently replaced by urology via cystoscopy overnight;    -c/w whitehead   -hematuria likely 2/2 traumatic placement;  -lasix on hold in the setting of tony;     >Chronic systolic congestive heart failure/ baseline EF 20-25%;/ hx of cad s/p MI in dec 2018 / has life vest at home   -continue metoprolol;  -hold lasix for now , patient takes lasix 40 po daily at home;     > Atrial fibrillation,   -on eliquis at home;  switched to heparin inpatient;  -continue metoprolol;     > chronic hypotension:  -c/w florinef and midodrine     > physical deconditioning :   -pt eval     >code status: pt is full code for now as per my discussion with pt and daughter. daughter and pt wish to have new updated healthcare proxy papers done. sw aware 85 yo male PMH Afib (home on eliquis), CAD s/p PCI to LAD (Nov 2018), CHFrEF (EF 20-25%), life vest, BPH, HTN, HLD, transferred to [facility] from [facility] for acute cholecystitis.  HIDA showed probable acute cholecystitis and he was deemed a poor surgical candidate so he underwent percutaneous cholecystostomy tube placement on 2/25.  He had a brief episode of hypotension requiring dopamine, which precipitated afib with RVR, and the dopamine was subsequently discontinued.  Course complicated by oliguric renal failure.  [place] dante catheter placed on 2/26.  Transferred to [facility] on 2/27 due to worsening renal failure with anticipated need for CVVHD.   Schwanz dante catheter removed;  cholecystosomy tube still draining;   kidney functioning improving, patient downgraded to medical service     >acute Cholecystitis :  -s/p Cholecystostomy tube placed on 2/25/2019;  today is POD#4;    -patient is afebrile, wbcs nl     -as per surgery No further acute surgical interventions needed, tube will be in place for 6-8 weeks.  Draining 200cc bile in last 24hours;    -plan of care discussed with patient and daughter (Luana);  fully aware that he needs to follow up with Surgeon  from [facility] where tube placed;     -surgery signed off case;  -c/w abxs   -Id consulted by icu team     >TONY (acute kidney injury)/ patient was transferred for possible need for cvvhd, renal function improving;    -patient given 250cc bolus fluid, then palced on Plasmalyte maintenance fluid on board  -monitor renal function -strict i's and o's;    -nephro on board   - original whitehead placed before transfer; subsequent renal US at [facility] showed misplaced whitehead;   whitehead catheter removed, subsequently replaced by urology via cystoscopy overnight;    -c/w whitehead   -hematuria likely 2/2 traumatic placement;  -lasix on hold in the setting of tony;     >hypotension / likely cardiogenic / sepsis / required iv dopamine / now dcd   -sbp now in 100s   -c/w abxs   -resumed on low dose bb     >Chronic systolic congestive heart failure/ baseline EF 20-25%;/ hx of cad s/p MI in dec 2018 / has life vest at home   -continue metoprolol;  -hold lasix for now , patient takes lasix 40 po daily at home;     > Atrial fibrillation,   -on eliquis at home; was  switched to heparin sq in micu   -will switch back to eliquis   -continue metoprolol;     > physical deconditioning :   -pt eval     >code status: pt is full code for now as per my discussion with pt and daughter. daughter and pt wish to have new updated healthcare proxy papers done. sw aware

## 2019-02-28 NOTE — CONSULT NOTE ADULT - SUBJECTIVE AND OBJECTIVE BOX
GENERAL SURGERY CONSULT    Consulting surgical team: ACS - Acute Care Surgery   Consulting attending: Melanie  Patient seen and examined: 19 @ 09:44      HPI:  83 yo male with hx of afib on eliquis, CAD s/p PCI to LAD in 2018, CHFrEF, life vest, BPH, HTN, HLD, transferred to [facility] from [facility] for acute cholecystitis.  HIDA showed probable acute cholecystis and he was deemed a poor surgical candidate so he underwent percutaneous cholecystostomy tube placement on .  He had a brief episode of hypotension requiring dopamine, which precipitated afib with RVR, and the dopamine was subsequently discontinued.  Course complicated by oliguric renal failure.  [place] dante catheter placed on .  Transferred to [facility] on  due to worsening renal failure with anticipated need for CVVHD.      Prior to transfer on 19: PAP 55/24, CO 5.21, CI 2.67. (2019 14:59)    Per patient he has had intermittent episodes of right upper quadrant pain for multiple months. 2 days prior to presentation at [facility] pain began more intense and constant with nausea; denied fever at that time. Transferred to [facility] for cardiac history. At [facility], he was found to have elevated T bilirubin of 2.1. An MRCP showed no choledocholithiasis. With pt't extensive cardiac hx to include CHF with EF>20%, afib, MI in 2018, cardiology deemed him a poor surgical candidate. IR subsequently placed a percholecystostomy tube. Cultures were evident for Gram neg rods and patient was started on zosyn. During ICU at [facility], he had episode of hypotension and declining renal function.  Blood pressure responded to dopamine (currently off pressor). He was transferred to [facility] MICU due to oliguric TONY with possible need for CVVH.  At this time he denies abdominal pain, nausea, vomiting, fever, and chills. He was awaiting PO challenge pending swallow study.      PAST MEDICAL HISTORY:  Atrial fibrillation  Systolic CHF  CAD (coronary artery disease)      PAST SURGICAL HISTORY:  History of partial gastrectomy      ALLERGIES:  eggs (Other)  No Known Drug Allergies      MEDICATIONS:  aspirin  chewable 81 milliGRAM(s) Oral daily  atorvastatin 80 milliGRAM(s) Oral at bedtime  citalopram 10 milliGRAM(s) Oral daily  clopidogrel Tablet 75 milliGRAM(s) Oral daily  heparin  Injectable 5000 Unit(s) SubCutaneous every 12 hours  metoprolol tartrate 25 milliGRAM(s) Oral two times a day  multiple electrolytes Injection Type 1 1000 milliLiter(s) IV Continuous <Continuous>  piperacillin/tazobactam IVPB. 3.375 Gram(s) IV Intermittent every 12 hours      VITALS & I/Os:  Vital Signs Last 24 Hrs  T(C): 36.5 (2019 07:27), Max: 36.8 (2019 23:19)  T(F): 97.7 (2019 07:27), Max: 98.3 (2019 23:19)  HR: 72 (2019 09:00) (70 - 97)  BP: 130/79 (2019 09:00) (104/84 - 141/81)  BP(mean): 98 (2019 09:00) (77 - 104)  RR: 15 (2019 09:) (13 - 38)  SpO2: 96% (2019 09:00) (90% - 99%)    I&O's Summary    2019 07:  -  2019 07:00  --------------------------------------------------------  IN: 1095 mL / OUT: 380 mL / NET: 715 mL    2019 07:01  -  2019 09:44  --------------------------------------------------------  IN: 45 mL / OUT: 40 mL / NET: 5 mL        PHYSICAL EXAM:  General: No acute distress  HEENT: Moist mucus membranes  Chest: Equal chest rising  Respiratory: Nonlabored  Cardiovascular: RRR  Abdominal: Soft, nontender. no guarding or rebound. Per makayla tube in place with no surrounding erythema or edema.   Extremities: Warm  Vascular: Radial Pulse 2+, bilaterally  MSK: No edema    LABS:                        9.9    6.7   )-----------( 156      ( 2019 05:52 )             31.7         137  |  97<L>  |  57.0<H>  ----------------------------<  105  4.2   |  29.0  |  2.84<H>    Ca    8.5<L>      2019 05:52  Phos  4.3       Mg     2.7         TPro  5.8<L>  /  Alb  3.1<L>  /  TBili  1.2  /  DBili  x   /  AST  20  /  ALT  14  /  AlkPhos  48      Lactate: Lactate, Blood: 0.8 mmol/L ( @ 16:13)     PT/INR - ( 2019 16:17 )   PT: 17.1 sec;   INR: 1.47 ratio         PTT - ( 2019 16:17 )  PTT:36.0 sec    CARDIAC MARKERS ( 2019 16:17 )  x     / 0.06 ng/mL / x     / x     / x            Urinalysis Basic - ( 2019 16:18 )    Color: Brown / Appearance: Slightly Turbid / S.015 / pH: x  Gluc: x / Ketone: Negative  / Bili: Negative / Urobili: 1 mg/dL   Blood: x / Protein: 500 mg/dL / Nitrite: Positive   Leuk Esterase: Moderate / RBC: >50 /HPF / WBC 11-25   Sq Epi: x / Non Sq Epi: Occasional / Bacteria: Few GENERAL SURGERY CONSULT    Consulting surgical team: ACS - Acute Care Surgery   Consulting attending: Melanie  Patient seen and examined: 19 @ 09:44      HPI:  85 yo male with hx of afib on eliquis, CAD s/p PCI to LAD in 2018, CHFrEF, life vest, BPH, HTN, HLD, transferred to [facility] from [facility] for acute cholecystitis.  HIDA showed probable acute cholecystis and he was deemed a poor surgical candidate so he underwent percutaneous cholecystostomy tube placement on .  He had a brief episode of hypotension requiring dopamine, which precipitated afib with RVR, and the dopamine was subsequently discontinued.  Course complicated by oliguric renal failure.  [place] dante catheter placed on .  Transferred to [facility] on  due to worsening renal failure with anticipated need for CVVHD.      Prior to transfer on 19: PAP 55/24, CO 5.21, CI 2.67. (2019 14:59)    Per patient he has had intermittent episodes of right upper quadrant pain for 2 months. 2 days prior to presentation at [facility] pain began more intense and constant with nausea; denied fever at that time. Transferred to [facility] for cardiac history. At [facility], he was found to have elevated T bilirubin of 2.1. An MRCP showed no choledocholithiasis. With pt't extensive cardiac hx to include CHF with EF>20%, afib, MI in 2018, cardiology deemed him a poor surgical candidate. IR subsequently placed a percholecystostomy tube. Cultures were evident for Gram neg rods and patient was started on zosyn. During ICU at [facility], he had episode of hypotension and declining renal function.  Blood pressure responded to dopamine (currently off pressor). He was transferred to [facility] MICU due to oliguric TONY with possible need for CVVH.  At this time he denies abdominal pain, nausea, vomiting, fever, and chills. He was awaiting PO challenge pending swallow study.      PAST MEDICAL HISTORY:  Atrial fibrillation  Systolic CHF  CAD (coronary artery disease)      PAST SURGICAL HISTORY:  History of partial gastrectomy 2/2 to perforated ulcer 30years ago      ALLERGIES:  eggs (Other)  No Known Drug Allergies      MEDICATIONS:  aspirin  chewable 81 milliGRAM(s) Oral daily  atorvastatin 80 milliGRAM(s) Oral at bedtime  citalopram 10 milliGRAM(s) Oral daily  clopidogrel Tablet 75 milliGRAM(s) Oral daily  heparin  Injectable 5000 Unit(s) SubCutaneous every 12 hours  metoprolol tartrate 25 milliGRAM(s) Oral two times a day  multiple electrolytes Injection Type 1 1000 milliLiter(s) IV Continuous <Continuous>  piperacillin/tazobactam IVPB. 3.375 Gram(s) IV Intermittent every 12 hours      VITALS & I/Os:  Vital Signs Last 24 Hrs  T(C): 36.5 (2019 07:27), Max: 36.8 (2019 23:19)  T(F): 97.7 (2019 07:27), Max: 98.3 (2019 23:19)  HR: 72 (2019 09:00) (70 - 97)  BP: 130/79 (2019 09:00) (104/84 - 141/81)  BP(mean): 98 (2019 09:00) (77 - 104)  RR: 15 (2019 09:00) (13 - 38)  SpO2: 96% (2019 09:00) (90% - 99%)    I&O's Summary    2019 07:  -  2019 07:00  --------------------------------------------------------  IN: 1095 mL / OUT: 380 mL / NET: 715 mL    2019 07:01  -  2019 09:44  --------------------------------------------------------  IN: 45 mL / OUT: 40 mL / NET: 5 mL        PHYSICAL EXAM:  General: No acute distress  HEENT: Moist mucus membranes  Chest: Equal chest rising  Respiratory: Nonlabored  Cardiovascular: RRR  Abdominal: Soft, nontender. no guarding or rebound. Per makayla tube in place with no surrounding erythema or edema.   Extremities: Warm  Vascular: Radial Pulse 2+, bilaterally  MSK: No edema    LABS:                        9.9    6.7   )-----------( 156      ( 2019 05:52 )             31.7         137  |  97<L>  |  57.0<H>  ----------------------------<  105  4.2   |  29.0  |  2.84<H>    Ca    8.5<L>      2019 05:52  Phos  4.3       Mg     2.7         TPro  5.8<L>  /  Alb  3.1<L>  /  TBili  1.2  /  DBili  x   /  AST  20  /  ALT  14  /  AlkPhos  48      Lactate: Lactate, Blood: 0.8 mmol/L ( @ 16:13)     PT/INR - ( 2019 16:17 )   PT: 17.1 sec;   INR: 1.47 ratio         PTT - ( 2019 16:17 )  PTT:36.0 sec    CARDIAC MARKERS ( 2019 16:17 )  x     / 0.06 ng/mL / x     / x     / x            Urinalysis Basic - ( 2019 16:18 )    Color: Brown / Appearance: Slightly Turbid / S.015 / pH: x  Gluc: x / Ketone: Negative  / Bili: Negative / Urobili: 1 mg/dL   Blood: x / Protein: 500 mg/dL / Nitrite: Positive   Leuk Esterase: Moderate / RBC: >50 /HPF / WBC 11-25   Sq Epi: x / Non Sq Epi: Occasional / Bacteria: Few GENERAL SURGERY CONSULT    Consulting surgical team: ACS - Acute Care Surgery   Consulting attending: Melanie  Patient seen and examined: 19 @ 09:44      HPI:  85 yo male with hx of afib on eliquis, CAD s/p PCI to LAD in 2018, CHFrEF, life vest, BPH, HTN, HLD, transferred [place] [facility] for acute cholecystitis.  HIDA showed probable acute cholecystis and he was deemed a poor surgical candidate so he underwent percutaneous cholecystostomy tube placement on .  He had a brief episode of hypotension requiring dopamine, which precipitated afib with RVR, and the dopamine was subsequently discontinued.  Course complicated by oliguric renal failure.  [place] dante catheter placed on .  Transferred to [facility] on  due to worsening renal failure with anticipated need for CVVHD.      Prior to transfer on 19: PAP 55/24, CO 5.21, CI 2.67. (2019 14:59)    Per patient he has had intermittent episodes of right upper quadrant pain for 2 months. 2 days prior to presentation at [facility] pain began more intense and constant with nausea; denied fever at that time. Transferred to ICU for cardiac history. At [facility], he was found to have elevated T bilirubin of 2.1. An MRCP showed no choledocholithiasis. With pt't extensive cardiac hx to include CHF with EF>20%, afib, MI in 2018, cardiology deemed him a poor surgical candidate. IR subsequently placed a percholecystostomy tube. Cultures were evident for Gram neg rods and patient was started on zosyn. During ICU at [facility], he had episode of hypotension and declining renal function.  Blood pressure responded to dopamine (currently off pressor). He was transferred to [facility] MICU due to oliguric TONY with possible need for CVVH.  At this time he denies abdominal pain, nausea, vomiting, fever, and chills. He was awaiting PO challenge pending swallow study.      PAST MEDICAL HISTORY:  Atrial fibrillation  Systolic CHF  CAD (coronary artery disease)      PAST SURGICAL HISTORY:  History of partial gastrectomy 2/2 to perforated ulcer 30years ago      ALLERGIES:  eggs (Other)  No Known Drug Allergies      MEDICATIONS:  aspirin  chewable 81 milliGRAM(s) Oral daily  atorvastatin 80 milliGRAM(s) Oral at bedtime  citalopram 10 milliGRAM(s) Oral daily  clopidogrel Tablet 75 milliGRAM(s) Oral daily  heparin  Injectable 5000 Unit(s) SubCutaneous every 12 hours  metoprolol tartrate 25 milliGRAM(s) Oral two times a day  multiple electrolytes Injection Type 1 1000 milliLiter(s) IV Continuous <Continuous>  piperacillin/tazobactam IVPB. 3.375 Gram(s) IV Intermittent every 12 hours      VITALS & I/Os:  Vital Signs Last 24 Hrs  T(C): 36.5 (2019 07:27), Max: 36.8 (2019 23:19)  T(F): 97.7 (2019 07:27), Max: 98.3 (2019 23:19)  HR: 72 (2019 09:00) (70 - 97)  BP: 130/79 (2019 09:00) (104/84 - 141/81)  BP(mean): 98 (2019 09:00) (77 - 104)  RR: 15 (2019 09:00) (13 - 38)  SpO2: 96% (2019 09:00) (90% - 99%)    I&O's Summary    2019 07:  -  2019 07:00  --------------------------------------------------------  IN: 1095 mL / OUT: 380 mL / NET: 715 mL    2019 07:01  -  2019 09:44  --------------------------------------------------------  IN: 45 mL / OUT: 40 mL / NET: 5 mL        PHYSICAL EXAM:  General: No acute distress  HEENT: Moist mucus membranes  Chest: Equal chest rising  Respiratory: Nonlabored  Cardiovascular: RRR  Abdominal: Soft, nontender. no guarding or rebound. Per makayla tube in place with no surrounding erythema or edema.   Extremities: Warm  Vascular: Radial Pulse 2+, bilaterally  MSK: No edema    LABS:                        9.9    6.7   )-----------( 156      ( 2019 05:52 )             31.7         137  |  97<L>  |  57.0<H>  ----------------------------<  105  4.2   |  29.0  |  2.84<H>    Ca    8.5<L>      2019 05:52  Phos  4.3       Mg     2.7         TPro  5.8<L>  /  Alb  3.1<L>  /  TBili  1.2  /  DBili  x   /  AST  20  /  ALT  14  /  AlkPhos  48      Lactate: Lactate, Blood: 0.8 mmol/L ( @ 16:13)     PT/INR - ( 2019 16:17 )   PT: 17.1 sec;   INR: 1.47 ratio         PTT - ( 2019 16:17 )  PTT:36.0 sec    CARDIAC MARKERS ( 2019 16:17 )  x     / 0.06 ng/mL / x     / x     / x            Urinalysis Basic - ( 2019 16:18 )    Color: Brown / Appearance: Slightly Turbid / S.015 / pH: x  Gluc: x / Ketone: Negative  / Bili: Negative / Urobili: 1 mg/dL   Blood: x / Protein: 500 mg/dL / Nitrite: Positive   Leuk Esterase: Moderate / RBC: >50 /HPF / WBC 11-25   Sq Epi: x / Non Sq Epi: Occasional / Bacteria: Few

## 2019-02-28 NOTE — PROGRESS NOTE ADULT - SUBJECTIVE AND OBJECTIVE BOX
transfer note:     cc: sepsis , hypotension, worsening renal function     hpi: 83 yo male with hx of afib on eliquis, CAD s/p PCI to LAD in Nov 2018, CHFrEF, life vest, BPH, HTN, HLD, transferred to [facility] from [facility] for acute cholecystitis.  HIDA showed probable acute cholecystis and he was deemed a poor surgical candidate so he underwent percutaneous cholecystostomy tube placement on 2/25.  He had a brief episode of hypotension requiring dopamine, which precipitated afib with RVR, and the dopamine was subsequently discontinued.  Course complicated by oliguric renal failure.  [place] dante catheter placed on 2/26.  Transferred to [facility] on 2/27 due to worsening renal failure with anticipated need for CVVHD.      interval hx: pt seen and evaluated  now swan dante dcd. foleys cath placed to monitor uop.  mild hematuria noted earlier but clearing up now. no fever or chills. daughter at bedside. pleasant , following commands. denies dizziness / sob/ cp.  patient alert / oriented x 2-3 , [place]. at baseline daughter states that hes been having some memory difficulty since MI in december 2018. as per daughter has not been following up with pcp regularly since then. daughter and sister are healthcare proxy, but daughter doesnt haven papers with her and would like new proxy papers done.     ICU Vital Signs Last 24 Hrs  T(C): 36.5 (28 Feb 2019 11:27), Max: 36.8 (27 Feb 2019 23:19)  T(F): 97.7 (28 Feb 2019 11:27), Max: 98.3 (27 Feb 2019 23:19)  HR: 83 (28 Feb 2019 10:00) (70 - 97)  BP: 100/66 (28 Feb 2019 10:00) (100/66 - 141/81)  BP(mean): 75 (28 Feb 2019 10:00) (75 - 104)  ABP: --  ABP(mean): --  RR: 24 (28 Feb 2019 10:00) (13 - 38)  SpO2: 100% (28 Feb 2019 10:00) (90% - 100%)      I&O's Detail    27 Feb 2019 07:01  -  28 Feb 2019 07:00  --------------------------------------------------------  IN:    multiple electrolytes Injection Type 1: 900 mL    sodium chloride 0.9%: 20 mL    Solution: 75 mL    Solution: 100 mL  Total IN: 1095 mL    OUT:    Drain: 200 mL    Indwelling Catheter - Urethral: 180 mL  Total OUT: 380 mL    Total NET: 715 mL    General- elderly male laying in bed;  nad;    Heent-  ncat; eomi; perrla;  moist oral mucosa;   resp- ctab; no wheezing/rales;  nasal cannula, 4l  abd- right upper quadrant percutaneous drainage; dark brown fluid drainage;   abd mildly distended;  nontender to palpation;   brown, loose stools;    gu-  whitehead in place  (urine blood tinged, traumatic whitehead)  ext- trace edema in b/l lower ext;  full kathia;     neuro- no focal deficits;   patient getting up out of bed, standing with assistance;     skin- bruise in the left flank; bruise in right upper ext;  no sacral ulcers visualized on inspection;                            9.9    6.7   )-----------( 156      ( 28 Feb 2019 05:52 )             31.7   02-28    137  |  97<L>  |  57.0<H>  ----------------------------<  105  4.2   |  29.0  |  2.84<H>    Ca    8.5<L>      28 Feb 2019 05:52  Phos  4.3     02-28  Mg     2.7     02-28    TPro  5.8<L>  /  Alb  3.1<L>  /  TBili  1.2  /  DBili  x   /  AST  20  /  ALT  14  /  AlkPhos  48  02-28

## 2019-02-28 NOTE — CONSULT NOTE ADULT - ASSESSMENT
85yo male transfer to [facility] for management of TONY; treated at [facility] for acute cholecystitis. Now with percutaneous cystostomy   - Hemodynamically stable  - WBC 6.7  - Currently on zosyn for 85yo male transfer to [facility] for management of TONY; treated at [facility] [place]ole[place]. Now with percutaneous cystostomy   - Hemodynamically stable  - WBC 6.7  - Currently on zosyn for gram negative rods on fluid culture  - 200cc drain of bile/24hours    Plan:  - No acute surgical intervention  - Discussed in detail with patient's daughter that tube will remain for approximately 6-8weeks. At the time a tube study will be completed to evaluate patency  - Pt may follow-up with surgeon at [facility] (Dr. Aguilar) where tube was placed  - Reconsult as needed.     Discussed and seen with Dr. Navarro

## 2019-02-28 NOTE — PROGRESS NOTE ADULT - SUBJECTIVE AND OBJECTIVE BOX
Subjective: 85yo male  admitted to ICU yesterday, called to evaluate pt for nonfunctioning whitehead catheter,  Catheter was replaced last night -  Pt underwent cystoscopy and placement of whitehead cath. Pt has no complaints of abdominal pain or bladder fullness this am.    Whitehead: blood tinged    Vital Signs Last 24 Hrs  T(C): 36.5 (28 Feb 2019 07:27), Max: 36.8 (27 Feb 2019 23:19)  T(F): 97.7 (28 Feb 2019 07:27), Max: 98.3 (27 Feb 2019 23:19)  HR: 72 (28 Feb 2019 09:00) (70 - 97)  BP: 130/79 (28 Feb 2019 09:00) (104/84 - 141/81)  BP(mean): 98 (28 Feb 2019 09:00) (77 - 104)  RR: 15 (28 Feb 2019 09:00) (13 - 38)  SpO2: 96% (28 Feb 2019 09:00) (90% - 99%)  I&O's Detail    27 Feb 2019 07:01  -  28 Feb 2019 07:00  --------------------------------------------------------  IN:    multiple electrolytes Injection Type 1: 900 mL    sodium chloride 0.9%: 20 mL    Solution: 75 mL    Solution: 100 mL  Total IN: 1095 mL    OUT:    Drain: 200 mL    Indwelling Catheter - Urethral: 180 mL  Total OUT: 380 mL    Total NET: 715 mL      28 Feb 2019 07:01  -  28 Feb 2019 09:36  --------------------------------------------------------  IN:    sodium chloride 0.9%: 20 mL    Solution: 25 mL  Total IN: 45 mL    OUT:    Indwelling Catheter - Urethral: 40 mL  Total OUT: 40 mL    Total NET: 5 mL          Labs:                        9.9    6.7   )-----------( 156      ( 28 Feb 2019 05:52 )             31.7     02-28    137  |  97<L>  |  57.0<H>  ----------------------------<  105  4.2   |  29.0  |  2.84<H>    Ca    8.5<L>      28 Feb 2019 05:52  Phos  4.3     02-28  Mg     2.7     02-28    TPro  5.8<L>  /  Alb  3.1<L>  /  TBili  1.2  /  DBili  x   /  AST  20  /  ALT  14  /  AlkPhos  48  02-28    PT/INR - ( 27 Feb 2019 16:17 )   PT: 17.1 sec;   INR: 1.47 ratio         PTT - ( 27 Feb 2019 16:17 )  PTT:36.0 sec

## 2019-02-28 NOTE — PROGRESS NOTE ADULT - PROBLEM SELECTOR PLAN 3
EF 20-25%;  probnp>30k  -continue metoprolol;  -watch for any signs of fluid overload;  -lasix held in the setting of rogelio; patient takes lasix 40 po daily at home;

## 2019-02-28 NOTE — PHYSICAL THERAPY INITIAL EVALUATION ADULT - IMPAIRMENTS FOUND, PT EVAL
aerobic capacity/endurance/muscle strength/posture/arousal, attention, and cognition/gait, locomotion, and balance/ventilation and respiration/gas exchange

## 2019-02-28 NOTE — PROGRESS NOTE ADULT - ASSESSMENT
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

## 2019-02-28 NOTE — PHYSICAL THERAPY INITIAL EVALUATION ADULT - IMPAIRMENTS CONTRIBUTING TO GAIT DEVIATIONS, PT EVAL
Pt limited by bowel incontinence and SPO2 decrease to 84% on 5L O2nc while standing./decreased strength/impaired postural control

## 2019-02-28 NOTE — SWALLOW BEDSIDE ASSESSMENT ADULT - SLP PERTINENT HISTORY OF CURRENT PROBLEM
As per MD note: "83 yo male PMH Afib (home on eliquis), CAD s/p PCI to LAD (Nov 2018), CHFrEF (EF 20-25%), life vest, BPH, HTN, HLD, [place] [facility] for acute cholecystitis.  HIDA showed probable acute cholecystitis and he was deemed a poor surgical candidate so he underwent percutaneous cholecystostomy tube placement on 2/25.  He had a brief episode of hypotension requiring dopamine, which precipitated afib with RVR, and the dopamine was subsequently discontinued.  Course complicated by oliguric renal failure.  [place] dante catheter placed on 2/26.  Transferred to [facility] on 2/27 due to worsening renal failure with anticipated need for CVVHD."

## 2019-02-28 NOTE — PROGRESS NOTE ADULT - PROBLEM SELECTOR PLAN 1
- Cholecystostomy tube placed on 2/25/2019;  today is POD#4;    -patient is afebrile, hemodynamically stable;     Surgery note appreciated;  No further acute surgical interventions needed, tube will be in place for 6-8 weeks.  Draining 200cc bile in last 24hours;    -plan of care discussed with patient and daughter (Luana);  fully aware that he needs to follow up with Surgeon  from [facility] where tube placed;     -surgery signed off case;  -zosyn on board;    -Id consulted;

## 2019-02-28 NOTE — PROGRESS NOTE ADULT - SUBJECTIVE AND OBJECTIVE BOX
Progress Note:     cc: sepsis , hypotension, worsening renal function     hpi: 85 yo male with hx of afib on eliquis, CAD s/p PCI to LAD in Nov 2018, CHFrEF, life vest, BPH, HTN, HLD, transferred to [facility] for acute cholecystitis.  HIDA showed probable acute cholecystis and he was deemed a poor surgical candidate so he underwent percutaneous cholecystostomy tube placement on 2/25.  He had a brief episode of hypotension requiring dopamine, which precipitated afib with RVR, and the dopamine was subsequently discontinued.  Course complicated by oliguric renal failure.  [place] dante catheter placed on 2/26.  Transferred to [facility] on 2/27 due to worsening renal failure with anticipated need for CVVHD.      interval hx: pt seen and evaluated  now [place] dante dcd. foleys cath placed to monitor uop.  mild hematuria noted earlier but clearing up now. no fever or chills. daughter at bedside. pleasant , following commands. denies dizziness / sob/ cp.  patient alert / oriented x 2-3 , [place]. at baseline daughter states that hes been having some memory difficulty since MI in december 2018. as per daughter has not been following up with pcp regularly since then. daughter and sister are healthcare proxy, but daughter doesnt haven papers with her and would like new proxy papers done.     ICU Vital Signs Last 24 Hrs  T(C): 36.5 (28 Feb 2019 11:27), Max: 36.8 (27 Feb 2019 23:19)  T(F): 97.7 (28 Feb 2019 11:27), Max: 98.3 (27 Feb 2019 23:19)  HR: 83 (28 Feb 2019 10:00) (70 - 97)  BP: 100/66 (28 Feb 2019 10:00) (100/66 - 141/81)  BP(mean): 75 (28 Feb 2019 10:00) (75 - 104)  ABP: --  ABP(mean): --  RR: 24 (28 Feb 2019 10:00) (13 - 38)  SpO2: 100% (28 Feb 2019 10:00) (90% - 100%)    I&O's Detail    27 Feb 2019 07:01  -  28 Feb 2019 07:00  --------------------------------------------------------  IN:    multiple electrolytes Injection Type 1: 900 mL    sodium chloride 0.9%: 20 mL    Solution: 75 mL    Solution: 100 mL  Total IN: 1095 mL    OUT:    Drain: 200 mL    Indwelling Catheter - Urethral: 180 mL  Total OUT: 380 mL    Total NET: 715 mL    General- elderly male laying in bed;  nad;    Heent-  ncat; eomi; perrla;  moist oral mucosa;   resp- ctab; no wheezing/rales;  nasal cannula, 4l  abd- right upper quadrant percutaneous drainage; dark brown fluid drainage;   abd mildly distended;  nontender to palpation;   brown, loose stools;    gu-  whitehead in place  (urine blood tinged, traumatic whitehead)  ext- trace edema in b/l lower ext;  full kathia;     neuro- no focal deficits;   patient getting up out of bed, standing with assistance;     skin- bruise in the left flank; bruise in right upper ext;  no sacral ulcers visualized on inspection;                            9.9    6.7   )-----------( 156      ( 28 Feb 2019 05:52 )             31.7   02-28    137  |  97<L>  |  57.0<H>  ----------------------------<  105  4.2   |  29.0  |  2.84<H>    Ca    8.5<L>      28 Feb 2019 05:52  Phos  4.3     02-28  Mg     2.7     02-28    TPro  5.8<L>  /  Alb  3.1<L>  /  TBili  1.2  /  DBili  x   /  AST  20  /  ALT  14  /  AlkPhos  48  02-28      Assessment and Plan:       85 yo male PMH Afib (home on eliquis), CAD s/p PCI to LAD (Nov 2018), CHFrEF (EF 20-25%), life vest, BPH, HTN, HLD, transferred to [facility] from [facility] for acute cholecystitis.  HIDA showed probable acute cholecystitis and he was deemed a poor surgical candidate so he underwent percutaneous cholecystostomy tube placement on 2/25.  He had a brief episode of hypotension requiring dopamine, which precipitated afib with RVR, and the dopamine was subsequently discontinued.  Course complicated by oliguric renal failure.  [place] dante catheter placed on 2/26.  Transferred to [facility] on 2/27 due to worsening renal failure with anticipated need for CVVHD.   [place] dante catheter removed;  cholecystosomy tube still draining;   kidney functioning improving, patient downgraded to medical service     >acute Cholecystitis :  -s/p Cholecystostomy tube placed on 2/25/2019;  today is POD#4;    -patient is afebrile, wbcs nl     -as per surgery No further acute surgical interventions needed, tube will be in place for 6-8 weeks.  Draining 200cc bile in last 24hours;    -plan of care discussed with patient and daughter (Luana);  fully aware that he needs to follow up with Surgeon  from [facility] where tube placed;     -surgery signed off case;  -c/w abxs   -Id consulted by icu team     >TONY (acute kidney injury)/ patient was transferred for possible need for cvvhd, renal function improving;    -patient given 250cc bolus fluid, then palced on Plasmalyte maintenance fluid on board  -monitor renal function -strict i's and o's;    - original whitehead placed before transfer; subsequent renal US at [facility] showed misplaced whitehead;   whitehead catheter removed, subsequently replaced by urology via cystoscopy overnight;    -c/w whitehead   -hematuria likely 2/2 traumatic placement;  -lasix on hold in the setting of tony;     >hypotension / likely cardiogenic / sepsis / required iv dopamine / now dcd   -sbp now in 100s   -c/w abxs   -resumed on low dose bb     >Chronic systolic congestive heart failure/ baseline EF 20-25%;/ hx of cad s/p MI in dec 2018 / has life vest at home   -continue metoprolol;  -hold lasix for now , patient takes lasix 40 po daily at home;     > Atrial fibrillation,   -on eliquis at home; was  switched to heparin sq in micu   -will switch back to eliquis   -continue metoprolol;

## 2019-02-28 NOTE — PROGRESS NOTE ADULT - SUBJECTIVE AND OBJECTIVE BOX
HPI:  85 yo male with hx of afib on eliquis, CAD s/p PCI to LAD in 2018, CHFrEF, life vest, BPH, HTN, HLD, transferred to [facility] from [facility] for acute cholecystitis.  HIDA showed probable acute cholecystis and he was deemed a poor surgical candidate so he underwent percutaneous cholecystostomy tube placement on .  He had a brief episode of hypotension requiring dopamine, which precipitated afib with RVR, and the dopamine was subsequently discontinued.  Course complicated by oliguric renal failure.  [place] dante catheter placed on .  Transferred to [facility] on  due to worsening renal failure with anticipated need for CVVHD.          Vital Signs Last 24 Hrs  T(C): 36.5 (2019 07:27), Max: 36.8 (2019 23:19)  T(F): 97.7 (2019 07:), Max: 98.3 (2019 23:19)  HR: 72 (2019 09:00) (70 - 97)  BP: 130/79 (2019 09:00) (104/84 - 141/81)  BP(mean): 98 (2019 09:00) (77 - 104)  RR: 15 (2019 09:00) (13 - 38)  SpO2: 96% (2019 09:00) (90% - 99%)      I&O's Detail    2019 07:01  -  2019 07:00  --------------------------------------------------------  IN:    multiple electrolytes Injection Type 1: 900 mL    sodium chloride 0.9%: 20 mL    Solution: 75 mL    Solution: 100 mL  Total IN: 1095 mL    OUT:    Drain: 200 mL    Indwelling Catheter - Urethral: 180 mL  Total OUT: 380 mL    Total NET: 715 mL      2019 07:01  -  2019 09:57  --------------------------------------------------------  IN:    sodium chloride 0.9%: 20 mL    Solution: 25 mL  Total IN: 45 mL    OUT:    Indwelling Catheter - Urethral: 40 mL  Total OUT: 40 mL    Total NET: 5 mL        Daily Height in cm: 154.68 (2019 16:00)    Daily Weight in k.7 (2019 03:13)    REVIEW OF SYSTEMS:    CONSTITUTIONAL: No fever, weight loss, or fatigue  EYES: No eye pain, visual disturbances, or discharge  ENMT:  No difficulty hearing, tinnitus, vertigo; No sinus or throat pain  NECK: No pain or stiffness  BREASTS: No pain, masses, or nipple discharge  RESPIRATORY: No cough, wheezing, chills or hemoptysis; No shortness of breath  CARDIOVASCULAR: No chest pain, palpitations, dizziness, or leg swelling  GASTROINTESTINAL: No abdominal or epigastric pain. No nausea, vomiting, or hematemesis; No diarrhea or constipation. No melena or hematochezia.  GENITOURINARY: No dysuria, frequency, hematuria, or incontinence  NEUROLOGICAL: No headaches, memory loss, loss of strength, numbness, or tremors  SKIN: No itching, burning, rashes, or lesions     PHYSICAL EXAM:      LABS:                        9.9    6.7   )-----------( 156      ( 2019 05:52 )             31.7         137  |  97<L>  |  57.0<H>  ----------------------------<  105  4.2   |  29.0  |  2.84<H>    Ca    8.5<L>      2019 05:52  Phos  4.3       Mg     2.7         TPro  5.8<L>  /  Alb  3.1<L>  /  TBili  1.2  /  DBili  x   /  AST  20  /  ALT  14  /  AlkPhos  48      PT/INR - ( 2019 16:17 )   PT: 17.1 sec;   INR: 1.47 ratio         PTT - ( 2019 16:17 )  PTT:36.0 sec  Urinalysis Basic - ( 2019 16:18 )    Color: Brown / Appearance: Slightly Turbid / S.015 / pH: x  Gluc: x / Ketone: Negative  / Bili: Negative / Urobili: 1 mg/dL   Blood: x / Protein: 500 mg/dL / Nitrite: Positive   Leuk Esterase: Moderate / RBC: >50 /HPF / WBC 11-25   Sq Epi: x / Non Sq Epi: Occasional / Bacteria: Few      Serum Pro-Brain Natriuretic Peptide: 57924 pg/mL ( @ 16:15)      PT/INR - ( 2019 16:17 )   PT: 17.1 sec;   INR: 1.47 ratio         PTT - ( 2019 16:17 )  PTT:36.0 sec HPI:  83 yo male with hx of afib on eliquis, CAD s/p PCI to LAD in 2018, CHFrEF, life vest, BPH, HTN, HLD, transferred to [facility] for acute cholecystitis.  HIDA showed probable acute cholecystis and he was deemed a poor surgical candidate so he underwent percutaneous cholecystostomy tube placement on .  He had a brief episode of hypotension requiring dopamine, which precipitated afib with RVR, and the dopamine was subsequently discontinued.  Course complicated by oliguric renal failure.  [place] dante catheter placed on .  Transferred to [facility] on  due to worsening renal failure with anticipated need for CVVHD.          Vital Signs Last 24 Hrs  T(C): 36.5 (2019 07:27), Max: 36.8 (2019 23:19)  T(F): 97.7 (2019 07:), Max: 98.3 (2019 23:19)  HR: 72 (2019 09:00) (70 - 97)  BP: 130/79 (2019 09:00) (104/84 - 141/81)  BP(mean): 98 (2019 09:00) (77 - 104)  RR: 15 (2019 09:00) (13 - 38)  SpO2: 96% (2019 09:00) (90% - 99%)      I&O's Detail    2019 07:01  -  2019 07:00  --------------------------------------------------------  IN:    multiple electrolytes Injection Type 1: 900 mL    sodium chloride 0.9%: 20 mL    Solution: 75 mL    Solution: 100 mL  Total IN: 1095 mL    OUT:    Drain: 200 mL    Indwelling Catheter - Urethral: 180 mL  Total OUT: 380 mL    Total NET: 715 mL      2019 07:01  -  2019 09:57  --------------------------------------------------------  IN:    sodium chloride 0.9%: 20 mL    Solution: 25 mL  Total IN: 45 mL    OUT:    Indwelling Catheter - Urethral: 40 mL  Total OUT: 40 mL    Total NET: 5 mL        Daily Height in cm: 154.68 (2019 16:00)    Daily Weight in k.7 (2019 03:13)    REVIEW OF SYSTEMS:    CONSTITUTIONAL: No fever, weight loss, or fatigue  EYES: No eye pain, visual disturbances, or discharge  ENMT:  No difficulty hearing, tinnitus, vertigo; No sinus or throat pain  NECK: No pain or stiffness  BREASTS: No pain, masses, or nipple discharge  RESPIRATORY: No cough, wheezing, chills or hemoptysis; No shortness of breath  CARDIOVASCULAR: No chest pain, palpitations, dizziness, or leg swelling  GASTROINTESTINAL: No abdominal or epigastric pain. No nausea, vomiting, or hematemesis  GENITOURINARY: No dysuria, frequency, hematuria;  POSITIVE BLOOD IN URINE;    NEUROLOGICAL: No headaches, memory loss, loss of strength, numbness, or tremors  SKIN: No itching, burning, rashes, or lesions;  bruise in right arm;    PHYSICAL EXAM:  General- elderly male laying in bed;  nad;    Heent-  ncat; eomi; perrla;  moist oral mucosa;   resp- ctab; no wheezing/rales  abd- right upper quadrant percutaneous drainage;   abd mildly distended;  nontender to palpation;   brown, loose stools;    gu-  whitehead in place  (urine blood tinged)  ext- trace edema in b/l lower ext;  full kathia;     neuro- no focal deficits;   patient getting up out of bed, standing with assistance;     skin- bruise in the left flank; bruise in right upper ext;  no sacral ulcers visualized on inspection;      LABS:                        9.9    6.7   )-----------( 156      ( 2019 05:52 )             31.7         137  |  97<L>  |  57.0<H>  ----------------------------<  105  4.2   |  29.0  |  2.84<H>    Ca    8.5<L>      2019 05:52  Phos  4.3       Mg     2.7     02-28    TPro  5.8<L>  /  Alb  3.1<L>  /  TBili  1.2  /  DBili  x   /  AST  20  /  ALT  14  /  AlkPhos  48      PT/INR - ( 2019 16:17 )   PT: 17.1 sec;   INR: 1.47 ratio         PTT - ( 2019 16:17 )  PTT:36.0 sec  Urinalysis Basic - ( 2019 16:18 )    Color: Brown / Appearance: Slightly Turbid / S.015 / pH: x  Gluc: x / Ketone: Negative  / Bili: Negative / Urobili: 1 mg/dL   Blood: x / Protein: 500 mg/dL / Nitrite: Positive   Leuk Esterase: Moderate / RBC: >50 /HPF / WBC 11-25   Sq Epi: x / Non Sq Epi: Occasional / Bacteria: Few      Serum Pro-Brain Natriuretic Peptide: 80595 pg/mL ( @ 16:15)      PT/INR - ( 2019 16:17 )   PT: 17.1 sec;   INR: 1.47 ratio         PTT - ( 2019 16:17 )  PTT:36.0 sec HPI:  83 yo male with hx of afib on eliquis, CAD s/p PCI to LAD in 2018, CHFrEF, life vest, BPH, HTN, HLD, transferred to [facility] for acute cholecystitis.  HIDA showed probable acute cholecystis and he was deemed a poor surgical candidate so he underwent percutaneous cholecystostomy tube placement on .  He had a brief episode of hypotension requiring dopamine, which precipitated afib with RVR, and the dopamine was subsequently discontinued.  Course complicated by oliguric renal failure.  [place] dante catheter placed on .  Transferred to [facility] on  due to worsening renal failure with anticipated need for CVVHD.          Vital Signs Last 24 Hrs  T(C): 36.5 (2019 07:27), Max: 36.8 (2019 23:19)  T(F): 97.7 (2019 07:), Max: 98.3 (2019 23:19)  HR: 72 (2019 09:00) (70 - 97)  BP: 130/79 (2019 09:00) (104/84 - 141/81)  BP(mean): 98 (2019 09:00) (77 - 104)  RR: 15 (2019 09:00) (13 - 38)  SpO2: 96% (2019 09:00) (90% - 99%)      I&O's Detail    2019 07:01  -  2019 07:00  --------------------------------------------------------  IN:    multiple electrolytes Injection Type 1: 900 mL    sodium chloride 0.9%: 20 mL    Solution: 75 mL    Solution: 100 mL  Total IN: 1095 mL    OUT:    Drain: 200 mL    Indwelling Catheter - Urethral: 180 mL  Total OUT: 380 mL    Total NET: 715 mL      2019 07:01  -  2019 09:57  --------------------------------------------------------  IN:    sodium chloride 0.9%: 20 mL    Solution: 25 mL  Total IN: 45 mL    OUT:    Indwelling Catheter - Urethral: 40 mL  Total OUT: 40 mL    Total NET: 5 mL        Daily Height in cm: 154.68 (2019 16:00)    Daily Weight in k.7 (2019 03:13)    REVIEW OF SYSTEMS:  CONSTITUTIONAL: No fever, weight loss  EYES: No eye pain, visual disturbances,   ENMT:  baseline difficulty hearing in both ears;     RESPIRATORY: No cough, wheezing, chills or hemoptysis; No shortness of breath  CARDIOVASCULAR: No chest pain, palpitations, dizziness, or leg swelling  GASTROINTESTINAL: No abdominal or epigastric pain.   GENITOURINARY: No dysuria;  POSITIVE BLOOD IN URINE;    NEUROLOGICAL: No headaches, memory loss, loss of strength, numbness, or tremors  SKIN: No itching, burning, rashes, or lesions; positive bruise in right arm;    PHYSICAL EXAM:  General- elderly male laying in bed;  nad;    Heent-  ncat; eomi; perrla;  moist oral mucosa;   resp- ctab; no wheezing/rales;  nasal cannula, 4l  abd- right upper quadrant percutaneous drainage; dark brown fluid drainage;   abd mildly distended;  nontender to palpation;   brown, loose stools;    gu-  whitehead in place  (urine blood tinged, traumatic whitehead)  ext- trace edema in b/l lower ext;  full kathia;     neuro- no focal deficits;   patient getting up out of bed, standing with assistance;     skin- bruise in the left flank; bruise in right upper ext;  no sacral ulcers visualized on inspection;      LABS:                        9.9    6.7   )-----------( 156      ( 2019 05:52 )             31.7         137  |  97<L>  |  57.0<H>  ----------------------------<  105  4.2   |  29.0  |  2.84<H>    Ca    8.5<L>      2019 05:52  Phos  4.3       Mg     2.7         TPro  5.8<L>  /  Alb  3.1<L>  /  TBili  1.2  /  DBili  x   /  AST  20  /  ALT  14  /  AlkPhos  48      PT/INR - ( 2019 16:17 )   PT: 17.1 sec;   INR: 1.47 ratio         PTT - ( 2019 16:17 )  PTT:36.0 sec  Urinalysis Basic - ( 2019 16:18 )    Color: Brown / Appearance: Slightly Turbid / S.015 / pH: x  Gluc: x / Ketone: Negative  / Bili: Negative / Urobili: 1 mg/dL   Blood: x / Protein: 500 mg/dL / Nitrite: Positive   Leuk Esterase: Moderate / RBC: >50 /HPF / WBC 11-25   Sq Epi: x / Non Sq Epi: Occasional / Bacteria: Few      Serum Pro-Brain Natriuretic Peptide: 24626 pg/mL ( @ 16:15)      PT/INR - ( 2019 16:17 )   PT: 17.1 sec;   INR: 1.47 ratio         PTT - ( 2019 16:17 )  PTT:36.0 sec    Renal u/s    Increased echogenicity concerning for a bilateral chronic medical renal   disease. Grossly enlarged prostate . Whitehead balloon catheter distended   within prostate.  Contracted bladder.  Mild RIGHT pleural effusion. Large LEFT pleural effusion. Mild abdominal   ascites.    Critical value  discussed with Dr. on 2019 at 7:23 PM

## 2019-02-28 NOTE — PROGRESS NOTE ADULT - ATTENDING COMMENTS
urine clearing, basically shaan now.    Recommend RUI whitehead in AM    Orders written urine clearing, basically shaan now.    Would wait until good activity level and normal bowel function before attempting voiding trial.

## 2019-02-28 NOTE — PROGRESS NOTE ADULT - PROBLEM SELECTOR PLAN 2
creat trending down;  patient producing urine;   patient was transferred for possible need for cvvhd; but kidney function improving;    patient given 250cc bolus fluid, then palced on Plasmalyte maintenance fluid on board;     -original whitehead placed before transfer; subsequent renal US at [facility] showed misplaced whitehead;   whitehead catheter removed, subsequently replaced by urology via cystoscopy overnight;    -strict i's and o's;    -will keep whitehead, as per urology;     -urine culture collected today;    -hematuria likely 2/2 traumatic placement;  -lasix on hold in the setting of rogelio;

## 2019-02-28 NOTE — PHYSICAL THERAPY INITIAL EVALUATION ADULT - PERTINENT HX OF CURRENT PROBLEM, REHAB EVAL
83 yo male with hx of afib on eliquis, CAD s/p PCI to LAD in Nov 2018, CHFrEF, life vest, BPH, HTN, HLD, [place] [facility] for acute cholecystitis. underwent percutaneous cholecystostomy tube placement on 2/25 [place] [facility], Course complicated by oliguric renal failure.  [place] dante catheter placed on 2/26.  Transferred to [facility] on 2/27 due to worsening renal failure.

## 2019-02-28 NOTE — SWALLOW BEDSIDE ASSESSMENT ADULT - SWALLOW EVAL: DIAGNOSIS
Oral stage negatively impacted by reduced arousal, otherwise functional.  Pharyngeal stage deemed WFL, no overt s/s penetration/aspiration noted.

## 2019-02-28 NOTE — PROGRESS NOTE ADULT - ASSESSMENT
83 yo male PMH Afib (home on eliquis), CAD s/p PCI to LAD (Nov 2018), CHFrEF (EF 20-25%), life vest, BPH, HTN, HLD, [place] [facility] [place] for acute cholecystitis.  HIDA showed probable acute cholecystitis and he was deemed a poor surgical candidate so he underwent percutaneous cholecystostomy tube placement on 2/25.  He had a brief episode of hypotension requiring dopamine, which precipitated afib with RVR, and the dopamine was subsequently discontinued.  Course complicated by oliguric renal failure.  Cecilia dante catheter placed on 2/26.  Transferred to [facility] on 2/27 due to worsening renal failure with anticipated need for CVVHD.         This am:   Schwanz dante catheter removed;   kidney functioning improving, patient downgraded to medical service under care ;   cholecystosomy tube still draining;

## 2019-02-28 NOTE — SWALLOW BEDSIDE ASSESSMENT ADULT - SWALLOW EVAL: RECOMMENDED FEEDING/EATING TECHNIQUES
Feed only when awake/alert/check mouth frequently for oral residue/pocketing/position upright (90 degrees)/maintain upright posture during/after eating for 30 mins/oral hygiene/allow for swallow between intakes/crush medication (when feasible)/small sips/bites

## 2019-03-01 LAB
ALBUMIN SERPL ELPH-MCNC: 2.7 G/DL — LOW (ref 3.3–5.2)
ALP SERPL-CCNC: 51 U/L — SIGNIFICANT CHANGE UP (ref 40–120)
ALT FLD-CCNC: 20 U/L — SIGNIFICANT CHANGE UP
ANION GAP SERPL CALC-SCNC: 12 MMOL/L — SIGNIFICANT CHANGE UP (ref 5–17)
AST SERPL-CCNC: 29 U/L — SIGNIFICANT CHANGE UP
BILIRUB SERPL-MCNC: 1.3 MG/DL — SIGNIFICANT CHANGE UP (ref 0.4–2)
BUN SERPL-MCNC: 59 MG/DL — HIGH (ref 8–20)
CALCIUM SERPL-MCNC: 8.7 MG/DL — SIGNIFICANT CHANGE UP (ref 8.6–10.2)
CHLORIDE SERPL-SCNC: 96 MMOL/L — LOW (ref 98–107)
CO2 SERPL-SCNC: 31 MMOL/L — HIGH (ref 22–29)
CREAT SERPL-MCNC: 2.67 MG/DL — HIGH (ref 0.5–1.3)
CULTURE RESULTS: NO GROWTH — SIGNIFICANT CHANGE UP
GLUCOSE SERPL-MCNC: 107 MG/DL — SIGNIFICANT CHANGE UP (ref 70–115)
HCT VFR BLD CALC: 34.6 % — LOW (ref 42–52)
HGB BLD-MCNC: 10.9 G/DL — LOW (ref 14–18)
MCHC RBC-ENTMCNC: 31.5 G/DL — LOW (ref 32–36)
MCHC RBC-ENTMCNC: 31.6 PG — HIGH (ref 27–31)
MCV RBC AUTO: 100.3 FL — HIGH (ref 80–94)
OSMOLALITY UR: 446 MOSM/KG — SIGNIFICANT CHANGE UP (ref 300–1000)
PHOSPHATE 24H UR-MCNC: 35.6 MG/DL — SIGNIFICANT CHANGE UP
PLATELET # BLD AUTO: 185 K/UL — SIGNIFICANT CHANGE UP (ref 150–400)
POTASSIUM SERPL-MCNC: 4.2 MMOL/L — SIGNIFICANT CHANGE UP (ref 3.5–5.3)
POTASSIUM SERPL-SCNC: 4.2 MMOL/L — SIGNIFICANT CHANGE UP (ref 3.5–5.3)
PROT SERPL-MCNC: 5.7 G/DL — LOW (ref 6.6–8.7)
RBC # BLD: 3.45 M/UL — LOW (ref 4.6–6.2)
RBC # FLD: 15.8 % — HIGH (ref 11–15.6)
SODIUM SERPL-SCNC: 139 MMOL/L — SIGNIFICANT CHANGE UP (ref 135–145)
SODIUM UR-SCNC: <30 MMOL/L — SIGNIFICANT CHANGE UP
SPECIMEN SOURCE: SIGNIFICANT CHANGE UP
WBC # BLD: 6.7 K/UL — SIGNIFICANT CHANGE UP (ref 4.8–10.8)
WBC # FLD AUTO: 6.7 K/UL — SIGNIFICANT CHANGE UP (ref 4.8–10.8)

## 2019-03-01 PROCEDURE — 99233 SBSQ HOSP IP/OBS HIGH 50: CPT

## 2019-03-01 PROCEDURE — 99231 SBSQ HOSP IP/OBS SF/LOW 25: CPT

## 2019-03-01 RX ORDER — METOPROLOL TARTRATE 50 MG
25 TABLET ORAL
Qty: 0 | Refills: 0 | Status: DISCONTINUED | OUTPATIENT
Start: 2019-03-01 | End: 2019-03-03

## 2019-03-01 RX ORDER — SACCHAROMYCES BOULARDII 250 MG
250 POWDER IN PACKET (EA) ORAL
Qty: 0 | Refills: 0 | Status: DISCONTINUED | OUTPATIENT
Start: 2019-03-01 | End: 2019-03-06

## 2019-03-01 RX ADMIN — PIPERACILLIN AND TAZOBACTAM 25 GRAM(S): 4; .5 INJECTION, POWDER, LYOPHILIZED, FOR SOLUTION INTRAVENOUS at 06:39

## 2019-03-01 RX ADMIN — ATORVASTATIN CALCIUM 80 MILLIGRAM(S): 80 TABLET, FILM COATED ORAL at 21:40

## 2019-03-01 RX ADMIN — APIXABAN 2.5 MILLIGRAM(S): 2.5 TABLET, FILM COATED ORAL at 11:07

## 2019-03-01 RX ADMIN — PIPERACILLIN AND TAZOBACTAM 25 GRAM(S): 4; .5 INJECTION, POWDER, LYOPHILIZED, FOR SOLUTION INTRAVENOUS at 17:19

## 2019-03-01 RX ADMIN — CITALOPRAM 10 MILLIGRAM(S): 10 TABLET, FILM COATED ORAL at 11:07

## 2019-03-01 RX ADMIN — Medication 250 MILLIGRAM(S): at 17:19

## 2019-03-01 RX ADMIN — Medication 25 MILLIGRAM(S): at 06:39

## 2019-03-01 RX ADMIN — CLOPIDOGREL BISULFATE 75 MILLIGRAM(S): 75 TABLET, FILM COATED ORAL at 11:07

## 2019-03-01 RX ADMIN — Medication 25 MILLIGRAM(S): at 17:19

## 2019-03-01 RX ADMIN — APIXABAN 2.5 MILLIGRAM(S): 2.5 TABLET, FILM COATED ORAL at 21:40

## 2019-03-01 NOTE — PROGRESS NOTE ADULT - ASSESSMENT
A : CMP , Low EF , TONY    S :   s/p whitehead catheter; cysto  Cr stable / improving  Maria Elena < 30; can give low dose IVF NS @ 60cc/hr for 24 hours then stop; watch closely for overload  Signing off  Please recall if needed  d/w Dr Rincon this AM

## 2019-03-01 NOTE — PROGRESS NOTE ADULT - SUBJECTIVE AND OBJECTIVE BOX
cc: acute cholecystitis , sepsis , hypotension, worsening renal function     hpi: 83 yo male with hx of afib on eliquis, CAD s/p PCI to LAD in Nov 2018, CHFrEF, life vest, BPH, HTN, HLD, [place] [facility] for acute cholecystitis.  HIDA showed probable acute cholecystis and he was deemed a poor surgical candidate so he underwent percutaneous cholecystostomy tube placement on 2/25.  He had a brief episode of hypotension requiring dopamine, which precipitated afib with RVR, and the dopamine was subsequently discontinued.  Course complicated by oliguric renal failure.  Vaughn dante catheter placed on 2/26.  Transferred to [facility] on 2/27 due to worsening renal failure with anticipated need for CVVHD.   now swan dante dcd. foleys cath placed to monitor uop.     interval hx: pt seen and evaluated  , daughter at bedside, no fever or chills. felt little dizzy earlier while standing up but orthostatics negative. no fever or chills. was alert / oriented x 2-3 earlier , sleepy now but able to awake with vocal commands. no n/v/abd pain       Vital Signs Last 24 Hrs  T(C): 36.6 (01 Mar 2019 15:16), Max: 36.8 (01 Mar 2019 09:53)  T(F): 97.9 (01 Mar 2019 15:16), Max: 98.2 (01 Mar 2019 09:53)  HR: 90 (01 Mar 2019 17:19) (46 - 91)  BP: 126/71 (01 Mar 2019 17:19) (119/78 - 138/86)  BP(mean): --  RR: 18 (01 Mar 2019 17:19) (17 - 19)  SpO2: 97% (01 Mar 2019 17:19) (97% - 99%)      General- elderly male laying in bed;  nad;    Heent-  ncat; eomi; perrla;  moist oral mucosa;   resp- ctab; no wheezing/rales;  nasal cannula, 4l  abd- right upper quadrant percutaneous drainage; dark brown fluid drainage;   abd mildly distended;  nontender to palpation;   brown, loose stools;    gu-  whitehead in place  (urine blood tinged, traumatic whitehead)  ext- trace edema in b/l lower ext;  full kathia;     neuro- no focal deficits;   patient getting up out of bed, standing with assistance;     skin- bruise in the left flank; bruise in right upper ext;  no sacral ulcers visualized on inspection;                            10.9   6.7   )-----------( 185      ( 01 Mar 2019 06:12 )             34.6   03-01    139  |  96<L>  |  59.0<H>  ----------------------------<  107  4.2   |  31.0<H>  |  2.67<H>    Ca    8.7      01 Mar 2019 06:12  Phos  4.3     02-28  Mg     2.7     02-28    TPro  5.7<L>  /  Alb  2.7<L>  /  TBili  1.3  /  DBili  x   /  AST  29  /  ALT  20  /  AlkPhos  51  03-01

## 2019-03-01 NOTE — PROGRESS NOTE ADULT - ASSESSMENT
85yo male with traumatic whitehead insertion, s/p cystoscopy, placement of whitehead cath, creatinine improving  - continue to monitor UO  - keep whitehead cath in place  - restart hytrin

## 2019-03-01 NOTE — PROGRESS NOTE ADULT - ASSESSMENT
83 yo male with hx of afib on eliquis, CAD s/p PCI to LAD in Nov 2018, CHFrEF, life vest, BPH, HTN, HLD, transferred to [facility] from [facility] for acute cholecystitis.  HIDA showed probable acute cholecystis and he was deemed a poor surgical candidate so he underwent percutaneous cholecystostomy tube placement on 2/25.  He had a brief episode of hypotension requiring dopamine, which precipitated afib with RVR, and the dopamine was subsequently discontinued.  Course complicated by oliguric renal failure.  [place] dante catheter placed on 2/26.  Transferred to [facility] on 2/27 due to worsening renal failure with anticipated need for CVVHD.     WAS IN ICU HERE AND NOW TRANSFERRED TO Telemetry  LETHARGIC BUT ANSWERS SIMPLE YES OR NO QUESTIONS   BODY FLUID CX GM NEG RODS   BLOOD CX PENDING   WILL RECOMMEND CONTINUE CURRENT REGIMEN ZOSYN TO COVER INTRAABDOMINAL PATHOGENS  D/W DAUGHTER AT BEDSIDE   WILL FOLLOW UP 85 yo male with hx of afib on eliquis, CAD s/p PCI to LAD in Nov 2018, CHFrEF, life vest, BPH, HTN, HLD, [place] [facility] [place] [facility] [place]stit[place].  HIDA showed probable acute cholecystis and he was deemed a poor surgical candidate so he underwent percutaneous cholecystostomy tube placement on 2/25.  He had a brief episode of hypotension requiring dopamine, which precipitated afib with RVR, and the dopamine was subsequently discontinued.  Course complicated by oliguric renal failure.  [place] dante catheter placed on 2/26.  Transferred to [facility] on 2/27 due to worsening renal failure with anticipated need for CVVHD.     WAS IN ICU HERE AND NOW TRANSFERRED TO Telemetry  LETHARGIC BUT ANSWERS SIMPLE YES OR NO QUESTIONS   BODY FLUID CX GM NEG RODS   FROM [facility]  BLOOD CX PENDING   WILL RECOMMEND CONTINUE CURRENT REGIMEN ZOSYN TO COVER INTRAABDOMINAL PATHOGENS  D/W DAUGHTER AT BEDSIDE   WILL FOLLOW UP

## 2019-03-01 NOTE — PROGRESS NOTE ADULT - ATTENDING COMMENTS
Agree with above.  Would not perform voiding trial until on alpha  blocker and activity level better with good bowel function.

## 2019-03-01 NOTE — PROGRESS NOTE ADULT - SUBJECTIVE AND OBJECTIVE BOX
[facility]  INFECTIOUS DISEASES AND INTERNAL MEDICINE at [place]  =======================================================  Rios Goss MD  Diplomates American Board of Internal Medicine and Infectious Diseases  =======================================================    MARIIA REY [id number]    Follow up: cholecystitis    Allergies:  eggs (Other)  No Known Drug Allergies      Medications:  apixaban 2.5 milliGRAM(s) Oral every 12 hours  atorvastatin 80 milliGRAM(s) Oral at bedtime  citalopram 10 milliGRAM(s) Oral daily  clopidogrel Tablet 75 milliGRAM(s) Oral daily  metoprolol tartrate 25 milliGRAM(s) Oral two times a day  multiple electrolytes Injection Type 1 1000 milliLiter(s) IV Continuous <Continuous>  piperacillin/tazobactam IVPB. 3.375 Gram(s) IV Intermittent every 12 hours  saccharomyces boulardii 250 milliGRAM(s) Oral two times a day    SOCIAL       FAMILY   FAMILY HISTORY:  No pertinent family history in first degree relatives    REVIEW OF SYSTEMS:  poor hisorian unable to give  ROS     CONSTITUTIONAL:  No Fever or chills                  Physical Exam:  ICU Vital Signs Last 24 Hrs  T(C): 36.8 (01 Mar 2019 09:53), Max: 36.8 (01 Mar 2019 09:53)  T(F): 98.2 (01 Mar 2019 09:53), Max: 98.2 (01 Mar 2019 09:53)  HR: 82 (01 Mar 2019 09:53) (46 - 87)  BP: 122/82 (01 Mar 2019 09:53) (119/78 - 129/80)  BP(mean): --  ABP: --  ABP(mean): --  RR: 17 (01 Mar 2019 09:53) (17 - 20)  SpO2: 98% (01 Mar 2019 09:53) (95% - 99%)    GEN: NAD, pleasant MORE AWAKE  HEENT: normocephalic and atraumatic. EOMI. EDGARDO.    NECK: Supple. No carotid bruits.  No lymphadenopathy or thyromegaly.  LUNGS: Clear to auscultation.  HEART: Regular rate and rhythm without murmur.  ABDOMEN: Soft, nontender, and nondistended.  Positive bowel sounds.    : No CVA tenderness  EXTREMITIES: Without any cyanosis, clubbing, rash, lesions or edema.  MSK: no joint swelling  NEUROLOGIC: MORE AWAKE TODAY.    .  SKIN: No ulceration or induration present.        Labs:      139  |  96<L>  |  59.0<H>  ----------------------------<  107  4.2   |  31.0<H>  |  2.67<H>    Ca    8.7      01 Mar 2019 06:12  Phos  4.3     02-28  Mg     2.7     -28    TPro  5.7<L>  /  Alb  2.7<L>  /  TBili  1.3  /  DBili  x   /  AST  29  /  ALT  20  /  AlkPhos  51                            10.9   6.7   )-----------( 185      ( 01 Mar 2019 06:12 )             34.6       PT/INR - ( 2019 16:17 )   PT: 17.1 sec;   INR: 1.47 ratio         PTT - ( 2019 16:17 )  PTT:36.0 sec  Urinalysis Basic - ( 2019 16:18 )    Color: Brown / Appearance: Slightly Turbid / S.015 / pH: x  Gluc: x / Ketone: Negative  / Bili: Negative / Urobili: 1 mg/dL   Blood: x / Protein: 500 mg/dL / Nitrite: Positive   Leuk Esterase: Moderate / RBC: >50 /HPF / WBC 11-25   Sq Epi: x / Non Sq Epi: Occasional / Bacteria: Few      LIVER FUNCTIONS - ( 01 Mar 2019 06:12 )  Alb: 2.7 g/dL / Pro: 5.7 g/dL / ALK PHOS: 51 U/L / ALT: 20 U/L / AST: 29 U/L / GGT: x           CARDIAC MARKERS ( 2019 16:17 )  x     / 0.06 ng/mL / x     / x     / x          CAPILLARY BLOOD GLUCOSE            RECENT CULTURES:

## 2019-03-01 NOTE — PROGRESS NOTE ADULT - SUBJECTIVE AND OBJECTIVE BOX
[facility] DIVISION OF KIDNEY DISEASES AND HYPERTENSION -- FOLLOW UP NOTE  --------------------------------------------------------------------------------  Chief Complaint:  Obstructive uropathy    24 hour events/subjective:  Pt seen and examined  Lying in bed in NAD  Cr stable;  ventura in place      PAST HISTORY  --------------------------------------------------------------------------------  No significant changes to PMH, PSH, FHx, SHx, unless otherwise noted    ALLERGIES & MEDICATIONS  --------------------------------------------------------------------------------  Allergies    Drug Allergies Not Recorded  eggs (Other)    Intolerances      Standing Inpatient Medications  apixaban 2.5 milliGRAM(s) Oral every 12 hours  atorvastatin 80 milliGRAM(s) Oral at bedtime  citalopram 10 milliGRAM(s) Oral daily  clopidogrel Tablet 75 milliGRAM(s) Oral daily  metoprolol tartrate 25 milliGRAM(s) Oral two times a day  multiple electrolytes Injection Type 1 1000 milliLiter(s) IV Continuous <Continuous>  piperacillin/tazobactam IVPB. 3.375 Gram(s) IV Intermittent every 12 hours  saccharomyces boulardii 250 milliGRAM(s) Oral two times a day    PRN Inpatient Medications      REVIEW OF SYSTEMS  --------------------------------------------------------------------------------  Gen: No weight changes, fatigue, fevers/chills, weakness  Skin: No rashes  Head/Eyes/Ears/Mouth: No headache; Normal hearing; Normal vision w/o blurriness; No sinus pain/discomfort, sore throat  Respiratory: No dyspnea, cough, wheezing, hemoptysis  CV: No chest pain, PND, orthopnea  GI: No abdominal pain, diarrhea, constipation, nausea, vomiting, melena, hematochezia  : No increased frequency, dysuria, hematuria, nocturia  MSK: No joint pain/swelling; no back pain; no edema  Neuro: No dizziness/lightheadedness, weakness, seizures, numbness, tingling  Heme: No easy bruising or bleeding  Endo: No heat/cold intolerance  Psych: No significant nervousness, anxiety, stress, depression    All other systems were reviewed and are negative, except as noted.    VITALS/PHYSICAL EXAM  --------------------------------------------------------------------------------  T(C): 36.6 (03-01-19 @ 15:16), Max: 36.8 (03-01-19 @ 09:53)  HR: 91 (03-01-19 @ 15:16) (46 - 91)  BP: 138/86 (03-01-19 @ 15:16) (119/78 - 138/86)  RR: 18 (03-01-19 @ 15:16) (17 - 19)  SpO2: 98% (03-01-19 @ 09:53) (98% - 99%)  Wt(kg): --        02-28-19 @ 07:01  -  03-01-19 @ 07:00  --------------------------------------------------------  IN: 375 mL / OUT: 975 mL / NET: -600 mL    03-01-19 @ 07:01  -  03-01-19 @ 17:12  --------------------------------------------------------  IN: 120 mL / OUT: 300 mL / NET: -180 mL      Physical Exam:  	Gen: NAD, well-appearing  	HEENT: PERRL, supple neck, clear oropharynx  	Pulm: CTA B/L  	CV: RRR, S1S2; no rub  	Back: No spinal or CVA tenderness; no sacral edema  	Abd: +BS, soft, nontender/nondistended  	: No suprapubic tenderness  	UE: Warm, FROM, no clubbing, intact strength; no edema; no asterixis  	LE: Warm, FROM, no clubbing, intact strength; no edema  	Neuro: No focal deficits, intact gait  	Psych: Normal affect and mood  	Skin: Warm, without rashes  	Vascular access:    LABS/STUDIES  --------------------------------------------------------------------------------              10.9   6.7   >-----------<  185      [03-01-19 @ 06:12]              34.6     139  |  96  |  59.0  ----------------------------<  107      [03-01-19 @ 06:12]  4.2   |  31.0  |  2.67        Ca     8.7     [03-01-19 @ 06:12]      Mg     2.7     [02-28-19 @ 05:52]      Phos  4.3     [02-28-19 @ 05:52]    TPro  5.7  /  Alb  2.7  /  TBili  1.3  /  DBili  x   /  AST  29  /  ALT  20  /  AlkPhos  51  [03-01-19 @ 06:12]          Creatinine Trend:  SCr 2.67 [03-01 @ 06:12]  SCr 2.84 [02-28 @ 05:52]  SCr 2.94 [02-28 @ 00:20]  SCr 2.93 [02-27 @ 16:17]    Urinalysis - [02-27-19 @ 16:18]      Color Brown / Appearance Slightly Turbid / SG 1.015 / pH 6.0      Gluc Negative / Ketone Negative  / Bili Negative / Urobili 1       Blood Large / Protein 500 / Leuk Est Moderate / Nitrite Positive      RBC >50 / WBC 11-25 / Hyaline  / Gran  / Sq Epi  / Non Sq Epi Occasional / Bacteria Few    Urine Creatinine 71      [02-27-19 @ 16:17]  Urine Protein 233.0      [02-27-19 @ 16:17]  Urine Sodium <30      [02-27-19 @ 16:17]  Urine Phosphorus 35.6      [02-28-19 @ 17:58]  Urine Osmolality 248      [02-27-19 @ 16:18]

## 2019-03-01 NOTE — PROGRESS NOTE ADULT - SUBJECTIVE AND OBJECTIVE BOX
Subjective:84yMale s/p bedside cysto, whitehead cath insertion. Pt more alert today, in NAD.    Whitehead: concentrated urine, no hematuria    Vital Signs Last 24 Hrs  T(C): 36.8 (01 Mar 2019 09:53), Max: 36.8 (01 Mar 2019 09:53)  T(F): 98.2 (01 Mar 2019 09:53), Max: 98.2 (01 Mar 2019 09:53)  HR: 82 (01 Mar 2019 09:53) (46 - 87)  BP: 122/82 (01 Mar 2019 09:53) (119/78 - 129/80)  BP(mean): 94 (28 Feb 2019 11:00) (94 - 94)  RR: 17 (01 Mar 2019 09:53) (15 - 20)  SpO2: 98% (01 Mar 2019 09:53) (95% - 99%)  I&O's Detail    28 Feb 2019 07:01  -  01 Mar 2019 07:00  --------------------------------------------------------  IN:    Oral Fluid: 180 mL    sodium chloride 0.9%: 20 mL    Solution: 175 mL  Total IN: 375 mL    OUT:    Drain: 110 mL    Indwelling Catheter - Urethral: 865 mL  Total OUT: 975 mL    Total NET: -600 mL          Labs:                        10.9   6.7   )-----------( 185      ( 01 Mar 2019 06:12 )             34.6     03-01    139  |  96<L>  |  59.0<H>  ----------------------------<  107  4.2   |  31.0<H>  |  2.67<H>    Ca    8.7      01 Mar 2019 06:12  Phos  4.3     02-28  Mg     2.7     02-28    TPro  5.7<L>  /  Alb  2.7<L>  /  TBili  1.3  /  DBili  x   /  AST  29  /  ALT  20  /  AlkPhos  51  03-01    PT/INR - ( 27 Feb 2019 16:17 )   PT: 17.1 sec;   INR: 1.47 ratio         PTT - ( 27 Feb 2019 16:17 )  PTT:36.0 sec

## 2019-03-02 LAB
ALBUMIN SERPL ELPH-MCNC: 2.9 G/DL — LOW (ref 3.3–5.2)
ALP SERPL-CCNC: 57 U/L — SIGNIFICANT CHANGE UP (ref 40–120)
ALT FLD-CCNC: 34 U/L — SIGNIFICANT CHANGE UP
ANION GAP SERPL CALC-SCNC: 12 MMOL/L — SIGNIFICANT CHANGE UP (ref 5–17)
AST SERPL-CCNC: 56 U/L — HIGH
BILIRUB SERPL-MCNC: 1.3 MG/DL — SIGNIFICANT CHANGE UP (ref 0.4–2)
BUN SERPL-MCNC: 60 MG/DL — HIGH (ref 8–20)
CALCIUM SERPL-MCNC: 8.8 MG/DL — SIGNIFICANT CHANGE UP (ref 8.6–10.2)
CHLORIDE SERPL-SCNC: 100 MMOL/L — SIGNIFICANT CHANGE UP (ref 98–107)
CO2 SERPL-SCNC: 28 MMOL/L — SIGNIFICANT CHANGE UP (ref 22–29)
CREAT SERPL-MCNC: 2.18 MG/DL — HIGH (ref 0.5–1.3)
GLUCOSE SERPL-MCNC: 112 MG/DL — SIGNIFICANT CHANGE UP (ref 70–115)
HCT VFR BLD CALC: 35.2 % — LOW (ref 42–52)
HGB BLD-MCNC: 11.1 G/DL — LOW (ref 14–18)
MCHC RBC-ENTMCNC: 31.1 PG — HIGH (ref 27–31)
MCHC RBC-ENTMCNC: 31.5 G/DL — LOW (ref 32–36)
MCV RBC AUTO: 98.6 FL — HIGH (ref 80–94)
PLATELET # BLD AUTO: 227 K/UL — SIGNIFICANT CHANGE UP (ref 150–400)
POTASSIUM SERPL-MCNC: 4 MMOL/L — SIGNIFICANT CHANGE UP (ref 3.5–5.3)
POTASSIUM SERPL-SCNC: 4 MMOL/L — SIGNIFICANT CHANGE UP (ref 3.5–5.3)
PROT SERPL-MCNC: 6 G/DL — LOW (ref 6.6–8.7)
RBC # BLD: 3.57 M/UL — LOW (ref 4.6–6.2)
RBC # FLD: 15.6 % — SIGNIFICANT CHANGE UP (ref 11–15.6)
SODIUM SERPL-SCNC: 140 MMOL/L — SIGNIFICANT CHANGE UP (ref 135–145)
WBC # BLD: 8.4 K/UL — SIGNIFICANT CHANGE UP (ref 4.8–10.8)
WBC # FLD AUTO: 8.4 K/UL — SIGNIFICANT CHANGE UP (ref 4.8–10.8)

## 2019-03-02 PROCEDURE — 99232 SBSQ HOSP IP/OBS MODERATE 35: CPT

## 2019-03-02 PROCEDURE — 71045 X-RAY EXAM CHEST 1 VIEW: CPT | Mod: 26

## 2019-03-02 PROCEDURE — 99233 SBSQ HOSP IP/OBS HIGH 50: CPT

## 2019-03-02 RX ORDER — FUROSEMIDE 40 MG
20 TABLET ORAL ONCE
Qty: 0 | Refills: 0 | Status: COMPLETED | OUTPATIENT
Start: 2019-03-02 | End: 2019-03-02

## 2019-03-02 RX ADMIN — PIPERACILLIN AND TAZOBACTAM 25 GRAM(S): 4; .5 INJECTION, POWDER, LYOPHILIZED, FOR SOLUTION INTRAVENOUS at 17:46

## 2019-03-02 RX ADMIN — PIPERACILLIN AND TAZOBACTAM 25 GRAM(S): 4; .5 INJECTION, POWDER, LYOPHILIZED, FOR SOLUTION INTRAVENOUS at 06:08

## 2019-03-02 RX ADMIN — APIXABAN 2.5 MILLIGRAM(S): 2.5 TABLET, FILM COATED ORAL at 17:46

## 2019-03-02 RX ADMIN — Medication 250 MILLIGRAM(S): at 06:08

## 2019-03-02 RX ADMIN — CLOPIDOGREL BISULFATE 75 MILLIGRAM(S): 75 TABLET, FILM COATED ORAL at 12:27

## 2019-03-02 RX ADMIN — Medication 25 MILLIGRAM(S): at 17:46

## 2019-03-02 RX ADMIN — Medication 25 MILLIGRAM(S): at 06:08

## 2019-03-02 RX ADMIN — Medication 250 MILLIGRAM(S): at 17:46

## 2019-03-02 RX ADMIN — CITALOPRAM 10 MILLIGRAM(S): 10 TABLET, FILM COATED ORAL at 12:27

## 2019-03-02 RX ADMIN — APIXABAN 2.5 MILLIGRAM(S): 2.5 TABLET, FILM COATED ORAL at 06:08

## 2019-03-02 RX ADMIN — Medication 20 MILLIGRAM(S): at 19:01

## 2019-03-02 RX ADMIN — ATORVASTATIN CALCIUM 80 MILLIGRAM(S): 80 TABLET, FILM COATED ORAL at 22:15

## 2019-03-02 RX ADMIN — Medication 100 MILLIGRAM(S): at 22:15

## 2019-03-02 NOTE — PROGRESS NOTE ADULT - ASSESSMENT
>Acute Cholecystitis s/p cholecystostomy.  Per surgery, outpatient f/u for removal.  Continue Abx.  [place] [facility] [place];     >TONY due to ATN, underlying CKD.  - Cr improving.  Discussed with Renal.  Continue Garcia as pt hs failed voiding trial.    >s/p Septic Shock - now off pressors.  BP stable.  Supportive care  >Chronic systolic congestive heart failure/ baseline EF 20-25%;/ hx of cad s/p MI in dec 2018 / has life vest at home -continue metoprolol.   Hold lasix for now , patient takes lasix 40 po daily at home;   > Atrial fibrillation - rate stable on Bblocker.  Eliquis  > physical deconditioning : -pt eval     BISHOP placement with likely need for extensive homecare vs LTC / Assisted living d/w daughter Luana, in agreement  ([phone])

## 2019-03-02 NOTE — PROGRESS NOTE ADULT - SUBJECTIVE AND OBJECTIVE BOX
Patient: MARIIA REY [id number] 84y Male                           Internal Medicine Hospitalist Progress Note    Initial HPI:  85 yo male PMH Afib (home on eliquis), CAD s/p PCI to LAD (2018), CHFrEF (EF 20-25%), life vest, BPH, HTN, HLD, transferred [place] [facility] for acute cholecystitis.  HIDA showed probable acute cholecystitis and he was deemed a poor surgical candidate so he underwent percutaneous cholecystostomy tube placement on .  He had a brief episode of hypotension requiring dopamine, which precipitated afib with RVR, and the dopamine was subsequently discontinued.  Course complicated by oliguric renal failure.  [place] dante catheter placed on .  Transferred to [facility] on  due to worsening renal failure with anticipated need for CVVHD.   Schwanz dante catheter removed;  cholecystosomy tube still draining;   kidney functioning improving, patient downgraded to medical service     Interval History:  Seen with daughter at bedside, states pt more alert, but still slightly confused from baseline.  He denies complaints.  No SOB.  no pain.      ____________________PHYSICAL EXAM:  Vitals reviewed as indicated below  GENERAL:  NAD Arousable, Oriented x 2 to person, place.   HEENT: NCAT  CARDIOVASCULAR:  S1, S2  LUNGS: CTAB  ABDOMEN:  soft, (-) tenderness, (-) distension, (+) bowel sounds, (-) guarding, (-) rebound (-) rigidity.  Drain in place.   EXTREMITIES:  no cyanosis / clubbing / edema.   ____________________      BACKGROUND:  HEALTH ISSUES - PROBLEM Dx:  Atrial fibrillation, unspecified type: Atrial fibrillation, unspecified type  Chronic systolic congestive heart failure: Chronic systolic congestive heart failure  Cholecystitis, acute: Cholecystitis, acute  CAD (coronary artery disease): CAD (coronary artery disease)  Atrial fibrillation: Atrial fibrillation  Systolic CHF: Systolic CHF  TONY (acute kidney injury): TONY (acute kidney injury)        Allergies    Drug Allergies Not Recorded  eggs (Other)    Intolerances      PAST MEDICAL & SURGICAL HISTORY:  Atrial fibrillation  Systolic CHF  CAD (coronary artery disease): s/p PCI to LAD in 2018  History of partial gastrectomy: for duodenal ulcer        VITALS:  Vital Signs Last 24 Hrs  T(C): 36.7 (02 Mar 2019 16:00), Max: 36.9 (02 Mar 2019 06:00)  T(F): 98 (02 Mar 2019 16:00), Max: 98.4 (02 Mar 2019 06:00)  HR: 80 (02 Mar 2019 16:00) (74 - 90)  BP: 100/50 (02 Mar 2019 16:00) (100/50 - 145/100)  BP(mean): --  RR: 20 (02 Mar 2019 16:00) (16 - 20)  SpO2: 96% (02 Mar 2019 09:00) (94% - 98%) Daily     Daily Weight in k.5 (02 Mar 2019 05:00)  CAPILLARY BLOOD GLUCOSE        I&O's Summary    01 Mar 2019 07:01  -  02 Mar 2019 07:00  --------------------------------------------------------  IN: 290 mL / OUT: 945 mL / NET: -655 mL          LABS:                        11.1   8.4   )-----------( 227      ( 02 Mar 2019 06:27 )             35.2     03-02    140  |  100  |  60.0<H>  ----------------------------<  112  4.0   |  28.0  |  2.18<H>    Ca    8.8      02 Mar 2019 06:27    TPro  6.0<L>  /  Alb  2.9<L>  /  TBili  1.3  /  DBili  x   /  AST  56<H>  /  ALT  34  /  AlkPhos  57  03-02      LIVER FUNCTIONS - ( 02 Mar 2019 06:27 )  Alb: 2.9 g/dL / Pro: 6.0 g/dL / ALK PHOS: 57 U/L / ALT: 34 U/L / AST: 56 U/L / GGT: x                     MEDICATIONS:  MEDICATIONS  (STANDING):  apixaban 2.5 milliGRAM(s) Oral every 12 hours  atorvastatin 80 milliGRAM(s) Oral at bedtime  citalopram 10 milliGRAM(s) Oral daily  clopidogrel Tablet 75 milliGRAM(s) Oral daily  metoprolol tartrate 25 milliGRAM(s) Oral two times a day  multiple electrolytes Injection Type 1 1000 milliLiter(s) (75 mL/Hr) IV Continuous <Continuous>  piperacillin/tazobactam IVPB. 3.375 Gram(s) IV Intermittent every 12 hours  saccharomyces boulardii 250 milliGRAM(s) Oral two times a day    MEDICATIONS  (PRN):

## 2019-03-02 NOTE — PROGRESS NOTE ADULT - SUBJECTIVE AND OBJECTIVE BOX
[facility]  INFECTIOUS DISEASES AND INTERNAL MEDICINE at [place]  =======================================================  Rios Goss MD  Diplomates American Board of Internal Medicine and Infectious Diseases  =======================================================    MARIIA REY [id number]    Follow up: cholecystitis  no new issues  confused      Allergies:  eggs (Other)  No Known Drug Allergies    Antibiotics:  piperacillin/tazobactam IVPB. 3.375 Gram(s) IV Intermittent every 12 hours      REVIEW OF SYSTEMS:  poor historian unable to give  ROS              Physical Exam:  T(F): 98 (02 Mar 2019 16:00), Max: 98.4 (02 Mar 2019 06:00)  HR: 48 (02 Mar 2019 20:00)  BP: 122/78 (02 Mar 2019 20:00)  RR: 18 (02 Mar 2019 20:00)  SpO2: 100% (02 Mar 2019 20:00) (94% - 100%)    GEN: NAD, pleasant MORE AWAKE  HEENT: normocephalic and atraumatic. EOMI. EDGARDO.    NECK: Supple. No carotid bruits.  No lymphadenopathy or thyromegaly.  LUNGS: fair air entry  HEART: Regular rate and rhythm without murmur.  ABDOMEN: Soft, nontender, and nondistended.  Positive bowel sounds.    right BILIARY DRAIN   : No CVA tenderness  EXTREMITIES: Without any cyanosis, clubbing, rash, lesions or edema.  MSK: no joint swelling  NEUROLOGIC: MORE AWAKE TODAY.  SKIN: No ulceration or induration present.      ===============  Labs:                        11.1   8.4   )-----------( 227      ( 02 Mar 2019 06:27 )             35.2     03-02    140  |  100  |  60.0<H>  ----------------------------<  112  4.0   |  28.0  |  2.18<H>    Ca    8.8      02 Mar 2019 06:27    TPro  6.0<L>  /  Alb  2.9<L>  /  TBili  1.3  /  DBili  x   /  AST  56<H>  /  ALT  34  /  AlkPhos  57  03-02       Culture - Blood (collected 02-28-19 @ 19:49)  Source: .Blood    Culture - Urine (collected 02-28-19 @ 09:36)  Source: .Urine  Final Report (03-01-19 @ 12:00):    No growth

## 2019-03-02 NOTE — CHART NOTE - NSCHARTNOTEFT_GEN_A_CORE
Called to see patient with adventitious lungs sounds  patient has been sleeping all day but fully accusable  No fever 02 at 99%  chest with rhonhi bilaterally  ext no edema   whitehead draining dark colored urine  Neuro alert and oriented    pulmonary toileting  performed will order atc  patient coughed up a lot of white mucus  Incentive spirometer  c/w zosyn  Will order cxr  robitussen ordered Called to see patient with adventitious lungs sounds  patient has been sleeping all day but fully accusable  No fever 02 at 99%  chest with rhonchi bilaterally  ext no edema   whitehead draining dark colored urine  Neuro alert and oriented    pulmonary toileting  performed will order atc  patient coughed up a lot of white mucus  Incentive spirometer  c/w zosyn  Will order cxr  Robitussin ordered

## 2019-03-02 NOTE — PROGRESS NOTE ADULT - SUBJECTIVE AND OBJECTIVE BOX
[facility] DIVISION OF KIDNEY DISEASES AND HYPERTENSION -- FOLLOW UP NOTE  --------------------------------------------------------------------------------  Chief Complaint:  Obstructive uropathy    24 hour events/subjective:  Pt seen and examined  Cr improving    PAST HISTORY  --------------------------------------------------------------------------------  No significant changes to PMH, PSH, FHx, SHx, unless otherwise noted    ALLERGIES & MEDICATIONS  --------------------------------------------------------------------------------  Allergies    Drug Allergies Not Recorded  eggs (Other)    Intolerances      Standing Inpatient Medications  apixaban 2.5 milliGRAM(s) Oral every 12 hours  atorvastatin 80 milliGRAM(s) Oral at bedtime  citalopram 10 milliGRAM(s) Oral daily  clopidogrel Tablet 75 milliGRAM(s) Oral daily  metoprolol tartrate 25 milliGRAM(s) Oral two times a day  multiple electrolytes Injection Type 1 1000 milliLiter(s) IV Continuous <Continuous>  piperacillin/tazobactam IVPB. 3.375 Gram(s) IV Intermittent every 12 hours  saccharomyces boulardii 250 milliGRAM(s) Oral two times a day    PRN Inpatient Medications      REVIEW OF SYSTEMS  --------------------------------------------------------------------------------  Gen: No weight changes, fatigue, fevers/chills, weakness  Skin: No rashes  Head/Eyes/Ears/Mouth: No headache; Normal hearing; Normal vision w/o blurriness; No sinus pain/discomfort, sore throat  Respiratory: No dyspnea, cough, wheezing, hemoptysis  CV: No chest pain, PND, orthopnea  GI: No abdominal pain, diarrhea, constipation, nausea, vomiting, melena, hematochezia  : No increased frequency, dysuria, hematuria, nocturia  MSK: No joint pain/swelling; no back pain; no edema  Neuro: No dizziness/lightheadedness, weakness, seizures, numbness, tingling  Heme: No easy bruising or bleeding  Endo: No heat/cold intolerance  Psych: No significant nervousness, anxiety, stress, depression    All other systems were reviewed and are negative, except as noted.    VITALS/PHYSICAL EXAM  --------------------------------------------------------------------------------  T(C): 36.9 (03-02-19 @ 06:00), Max: 36.9 (03-02-19 @ 06:00)  HR: 80 (03-02-19 @ 09:00) (74 - 91)  BP: 145/100 (03-02-19 @ 06:00) (114/64 - 145/100)  RR: 16 (03-02-19 @ 09:00) (16 - 18)  SpO2: 96% (03-02-19 @ 09:00) (94% - 98%)  Wt(kg): --        03-01-19 @ 07:01  -  03-02-19 @ 07:00  --------------------------------------------------------  IN: 290 mL / OUT: 945 mL / NET: -655 mL      Physical Exam:  	Gen: NAD  	HEENT: PERRL, supple neck, clear oropharynx  	Pulm: CTA B/L  	CV: RRR, S1S2; no rub  	Back: No spinal or CVA tenderness; no sacral edema  	Abd: +BS, soft, nontender/nondistended  	: No suprapubic tenderness  	UE: Warm, FROM, no clubbing, intact strength; no edema; no asterixis  	LE: Warm, FROM, no clubbing, intact strength; no edema  	Neuro: No focal deficits, intact gait  	Psych: Normal affect and mood  	Skin: Warm, without rashes  	Vascular access:    LABS/STUDIES  --------------------------------------------------------------------------------              11.1   8.4   >-----------<  227      [03-02-19 @ 06:27]              35.2     140  |  100  |  60.0  ----------------------------<  112      [03-02-19 @ 06:27]  4.0   |  28.0  |  2.18        Ca     8.8     [03-02-19 @ 06:27]    TPro  6.0  /  Alb  2.9  /  TBili  1.3  /  DBili  x   /  AST  56  /  ALT  34  /  AlkPhos  57  [03-02-19 @ 06:27]          Creatinine Trend:  SCr 2.18 [03-02 @ 06:27]  SCr 2.67 [03-01 @ 06:12]  SCr 2.84 [02-28 @ 05:52]  SCr 2.94 [02-28 @ 00:20]  SCr 2.93 [02-27 @ 16:17]    Urinalysis - [02-27-19 @ 16:18]      Color Brown / Appearance Slightly Turbid / SG 1.015 / pH 6.0      Gluc Negative / Ketone Negative  / Bili Negative / Urobili 1       Blood Large / Protein 500 / Leuk Est Moderate / Nitrite Positive      RBC >50 / WBC 11-25 / Hyaline  / Gran  / Sq Epi  / Non Sq Epi Occasional / Bacteria Few    Urine Creatinine 71      [02-27-19 @ 16:17]  Urine Protein 233.0      [02-27-19 @ 16:17]  Urine Sodium <30      [03-01-19 @ 19:54]  Urine Phosphorus 35.6      [02-28-19 @ 17:58]  Urine Osmolality 446      [03-01-19 @ 19:52]

## 2019-03-02 NOTE — PROGRESS NOTE ADULT - ASSESSMENT
A : CMP , Low EF , TONY    S :   s/p whitehead catheter; cysto  Cr stable / improving  Signing off  Please recall if needed    d/w Dr Bey

## 2019-03-02 NOTE — PROGRESS NOTE ADULT - ASSESSMENT
83 yo male with hx of afib on eliquis, CAD s/p PCI to LAD in Nov 2018, CHFrEF, life vest, BPH, HTN, HLD, transferred to [facility] from [facility] for acute cholecystitis.    S/P PERC DRAINAGE BY IR     BODY FLUID CX GM NEG RODS   FROM [facility] - PENDING ID    - CONTINUE zosyn for cholecystitis    - follow up all outstanding cultures  - trend temperature and WBC curve  - repeat cultures from blood and all sources if febrile.

## 2019-03-03 LAB
ANION GAP SERPL CALC-SCNC: 13 MMOL/L — SIGNIFICANT CHANGE UP (ref 5–17)
BUN SERPL-MCNC: 58 MG/DL — HIGH (ref 8–20)
CALCIUM SERPL-MCNC: 9 MG/DL — SIGNIFICANT CHANGE UP (ref 8.6–10.2)
CHLORIDE SERPL-SCNC: 100 MMOL/L — SIGNIFICANT CHANGE UP (ref 98–107)
CO2 SERPL-SCNC: 30 MMOL/L — HIGH (ref 22–29)
CREAT SERPL-MCNC: 2.02 MG/DL — HIGH (ref 0.5–1.3)
GLUCOSE SERPL-MCNC: 110 MG/DL — SIGNIFICANT CHANGE UP (ref 70–115)
HCT VFR BLD CALC: 34 % — LOW (ref 42–52)
HGB BLD-MCNC: 10.8 G/DL — LOW (ref 14–18)
MCHC RBC-ENTMCNC: 31.8 G/DL — LOW (ref 32–36)
MCHC RBC-ENTMCNC: 32 PG — HIGH (ref 27–31)
MCV RBC AUTO: 100.9 FL — HIGH (ref 80–94)
PLATELET # BLD AUTO: 228 K/UL — SIGNIFICANT CHANGE UP (ref 150–400)
POTASSIUM SERPL-MCNC: 4.8 MMOL/L — SIGNIFICANT CHANGE UP (ref 3.5–5.3)
POTASSIUM SERPL-SCNC: 4.8 MMOL/L — SIGNIFICANT CHANGE UP (ref 3.5–5.3)
RBC # BLD: 3.37 M/UL — LOW (ref 4.6–6.2)
RBC # FLD: 16.1 % — HIGH (ref 11–15.6)
SODIUM SERPL-SCNC: 143 MMOL/L — SIGNIFICANT CHANGE UP (ref 135–145)
WBC # BLD: 8.5 K/UL — SIGNIFICANT CHANGE UP (ref 4.8–10.8)
WBC # FLD AUTO: 8.5 K/UL — SIGNIFICANT CHANGE UP (ref 4.8–10.8)

## 2019-03-03 PROCEDURE — 99232 SBSQ HOSP IP/OBS MODERATE 35: CPT

## 2019-03-03 RX ORDER — ATORVASTATIN CALCIUM 80 MG/1
1 TABLET, FILM COATED ORAL
Qty: 0 | Refills: 0 | COMMUNITY

## 2019-03-03 RX ORDER — FUROSEMIDE 40 MG
1 TABLET ORAL
Qty: 0 | Refills: 0 | COMMUNITY

## 2019-03-03 RX ORDER — CLOPIDOGREL BISULFATE 75 MG/1
1 TABLET, FILM COATED ORAL
Qty: 0 | Refills: 0 | COMMUNITY

## 2019-03-03 RX ORDER — CITALOPRAM 10 MG/1
1 TABLET, FILM COATED ORAL
Qty: 0 | Refills: 0 | COMMUNITY

## 2019-03-03 RX ORDER — ASPIRIN/CALCIUM CARB/MAGNESIUM 324 MG
1 TABLET ORAL
Qty: 0 | Refills: 0 | COMMUNITY

## 2019-03-03 RX ORDER — METOPROLOL TARTRATE 50 MG
12.5 TABLET ORAL EVERY 12 HOURS
Qty: 0 | Refills: 0 | Status: DISCONTINUED | OUTPATIENT
Start: 2019-03-03 | End: 2019-03-06

## 2019-03-03 RX ORDER — TERAZOSIN HYDROCHLORIDE 10 MG/1
1 CAPSULE ORAL
Qty: 0 | Refills: 0 | COMMUNITY

## 2019-03-03 RX ADMIN — PIPERACILLIN AND TAZOBACTAM 25 GRAM(S): 4; .5 INJECTION, POWDER, LYOPHILIZED, FOR SOLUTION INTRAVENOUS at 16:56

## 2019-03-03 RX ADMIN — CITALOPRAM 10 MILLIGRAM(S): 10 TABLET, FILM COATED ORAL at 11:53

## 2019-03-03 RX ADMIN — Medication 250 MILLIGRAM(S): at 06:19

## 2019-03-03 RX ADMIN — PIPERACILLIN AND TAZOBACTAM 25 GRAM(S): 4; .5 INJECTION, POWDER, LYOPHILIZED, FOR SOLUTION INTRAVENOUS at 06:20

## 2019-03-03 RX ADMIN — APIXABAN 2.5 MILLIGRAM(S): 2.5 TABLET, FILM COATED ORAL at 16:56

## 2019-03-03 RX ADMIN — APIXABAN 2.5 MILLIGRAM(S): 2.5 TABLET, FILM COATED ORAL at 06:19

## 2019-03-03 RX ADMIN — Medication 250 MILLIGRAM(S): at 16:56

## 2019-03-03 RX ADMIN — ATORVASTATIN CALCIUM 80 MILLIGRAM(S): 80 TABLET, FILM COATED ORAL at 21:26

## 2019-03-03 RX ADMIN — Medication 12.5 MILLIGRAM(S): at 16:56

## 2019-03-03 RX ADMIN — CLOPIDOGREL BISULFATE 75 MILLIGRAM(S): 75 TABLET, FILM COATED ORAL at 11:53

## 2019-03-03 RX ADMIN — Medication 25 MILLIGRAM(S): at 06:19

## 2019-03-03 RX ADMIN — Medication 100 MILLIGRAM(S): at 06:24

## 2019-03-03 NOTE — PROGRESS NOTE ADULT - SUBJECTIVE AND OBJECTIVE BOX
Patient: MARIIA REY [id number] 84y Male                           Internal Medicine Hospitalist Progress Note    Initial HPI:  83 yo male PMH Afib (home on eliquis), CAD s/p PCI to LAD (2018), CHFrEF (EF 20-25%), life vest, BPH, HTN, HLD, transferred to [facility] [place] [facility] for acute cholecystitis.  HIDA showed probable acute cholecystitis and he was deemed a poor surgical candidate so he underwent percutaneous cholecystostomy tube placement on .  He had a brief episode of hypotension requiring dopamine, which precipitated afib with RVR, and the dopamine was subsequently discontinued.  Course complicated by oliguric renal failure.  [place] dante catheter placed on .  Transferred to [facility] on  due to worsening renal failure with anticipated need for CVVHD.   Schwanz dante catheter removed;  cholecystosomy tube still draining;   kidney functioning improving, patient downgraded to medical service     Interval History:  Pt still somnolent, confused.  He denies complaints.  No SOB.  no pain.  On Tele, several pauses < 3 sec.  Pt asymptomatic.  denies cp / palpitations.  No additional complaints.     ____________________PHYSICAL EXAM:  Vitals reviewed as indicated below  GENERAL:  NAD Arousable, Oriented x 2 to person, place.   HEENT: NCAT  CARDIOVASCULAR:  S1, S2  LUNGS: CTAB  ABDOMEN:  soft, (-) tenderness, (-) distension, (+) bowel sounds, (-) guarding, (-) rebound (-) rigidity.  Drain in place.   EXTREMITIES:  no cyanosis / clubbing / edema.   ____________________    VITALS:  Vital Signs Last 24 Hrs  T(C): 36.4 (03 Mar 2019 10:00), Max: 36.7 (02 Mar 2019 16:00)  T(F): 97.5 (03 Mar 2019 10:00), Max: 98.1 (03 Mar 2019 06:15)  HR: 57 (03 Mar 2019 10:00) (48 - 80)  BP: 114/76 (03 Mar 2019 10:00) (100/50 - 124/86)  BP(mean): --  RR: 17 (03 Mar 2019 10:00) (16 - 20)  SpO2: 99% (03 Mar 2019 09:00) (98% - 100%) Daily     Daily Weight in k.6 (03 Mar 2019 06:20)  CAPILLARY BLOOD GLUCOSE        I&O's Summary    02 Mar 2019 07:01  -  03 Mar 2019 07:00  --------------------------------------------------------  IN: 110 mL / OUT: 925 mL / NET: -815 mL    03 Mar 2019 07:01  -  03 Mar 2019 15:29  --------------------------------------------------------  IN: 120 mL / OUT: 0 mL / NET: 120 mL        LABS:                        10.8   8.5   )-----------( 228      ( 03 Mar 2019 06:25 )             34.0     03-    143  |  100  |  58.0<H>  ----------------------------<  110  4.8   |  30.0<H>  |  2.02<H>    Ca    9.0      03 Mar 2019 06:25    TPro  6.0<L>  /  Alb  2.9<L>  /  TBili  1.3  /  DBili  x   /  AST  56<H>  /  ALT  34  /  AlkPhos  57  03-02      LIVER FUNCTIONS - ( 02 Mar 2019 06:27 )  Alb: 2.9 g/dL / Pro: 6.0 g/dL / ALK PHOS: 57 U/L / ALT: 34 U/L / AST: 56 U/L / GGT: x                   MEDICATIONS:  apixaban 2.5 milliGRAM(s) Oral every 12 hours  atorvastatin 80 milliGRAM(s) Oral at bedtime  citalopram 10 milliGRAM(s) Oral daily  clopidogrel Tablet 75 milliGRAM(s) Oral daily  guaiFENesin    Syrup 100 milliGRAM(s) Oral every 8 hours  metoprolol tartrate 12.5 milliGRAM(s) Oral every 12 hours  multiple electrolytes Injection Type 1 1000 milliLiter(s) IV Continuous <Continuous>  piperacillin/tazobactam IVPB. 3.375 Gram(s) IV Intermittent every 12 hours  saccharomyces boulardii 250 milliGRAM(s) Oral two times a day

## 2019-03-03 NOTE — PROGRESS NOTE ADULT - ASSESSMENT
>Acute Cholecystitis s/p cholecystostomy.  Per surgery, outpatient f/u for removal.  Continue Abx.  Surgeon  from [facility] [place];     >TONY due to ATN, underlying CKD.  - Cr improving.  Discussed with Renal.  Continue Garcia as pt hs failed voiding trial.    >s/p Septic Shock - now off pressors.  BP stable.  Supportive care  >Chronic systolic congestive heart failure/ baseline EF 20-25%;/ hx of cad s/p MI in dec 2018 / has life vest at home.  Adjust metoprolol.   Hold lasix for now , patient takes lasix 40 po daily at home.  D/c Tele.   > Atrial fibrillation - rate stable on Bblocker.  Eliquis  > physical deconditioning : -pt eval     [facility] placement with likely eventual need for extensive homecare vs LTC / Assisted living d/w daughter Luana, in agreement  ([phone])

## 2019-03-04 LAB
ANION GAP SERPL CALC-SCNC: 10 MMOL/L — SIGNIFICANT CHANGE UP (ref 5–17)
BUN SERPL-MCNC: 53 MG/DL — HIGH (ref 8–20)
CALCIUM SERPL-MCNC: 8.7 MG/DL — SIGNIFICANT CHANGE UP (ref 8.6–10.2)
CHLORIDE SERPL-SCNC: 100 MMOL/L — SIGNIFICANT CHANGE UP (ref 98–107)
CO2 SERPL-SCNC: 31 MMOL/L — HIGH (ref 22–29)
CREAT SERPL-MCNC: 1.67 MG/DL — HIGH (ref 0.5–1.3)
GLUCOSE SERPL-MCNC: 105 MG/DL — SIGNIFICANT CHANGE UP (ref 70–115)
POTASSIUM SERPL-MCNC: 3.8 MMOL/L — SIGNIFICANT CHANGE UP (ref 3.5–5.3)
POTASSIUM SERPL-SCNC: 3.8 MMOL/L — SIGNIFICANT CHANGE UP (ref 3.5–5.3)
SODIUM SERPL-SCNC: 141 MMOL/L — SIGNIFICANT CHANGE UP (ref 135–145)

## 2019-03-04 PROCEDURE — 71045 X-RAY EXAM CHEST 1 VIEW: CPT | Mod: 26

## 2019-03-04 PROCEDURE — 99233 SBSQ HOSP IP/OBS HIGH 50: CPT

## 2019-03-04 PROCEDURE — 99232 SBSQ HOSP IP/OBS MODERATE 35: CPT

## 2019-03-04 RX ORDER — FUROSEMIDE 40 MG
20 TABLET ORAL DAILY
Qty: 0 | Refills: 0 | Status: DISCONTINUED | OUTPATIENT
Start: 2019-03-04 | End: 2019-03-05

## 2019-03-04 RX ADMIN — Medication 12.5 MILLIGRAM(S): at 05:54

## 2019-03-04 RX ADMIN — Medication 100 MILLIGRAM(S): at 15:33

## 2019-03-04 RX ADMIN — CITALOPRAM 10 MILLIGRAM(S): 10 TABLET, FILM COATED ORAL at 12:04

## 2019-03-04 RX ADMIN — CLOPIDOGREL BISULFATE 75 MILLIGRAM(S): 75 TABLET, FILM COATED ORAL at 12:04

## 2019-03-04 RX ADMIN — Medication 250 MILLIGRAM(S): at 05:54

## 2019-03-04 RX ADMIN — Medication 20 MILLIGRAM(S): at 15:37

## 2019-03-04 RX ADMIN — APIXABAN 2.5 MILLIGRAM(S): 2.5 TABLET, FILM COATED ORAL at 05:54

## 2019-03-04 RX ADMIN — Medication 250 MILLIGRAM(S): at 18:58

## 2019-03-04 RX ADMIN — PIPERACILLIN AND TAZOBACTAM 25 GRAM(S): 4; .5 INJECTION, POWDER, LYOPHILIZED, FOR SOLUTION INTRAVENOUS at 05:57

## 2019-03-04 RX ADMIN — Medication 100 MILLIGRAM(S): at 22:19

## 2019-03-04 RX ADMIN — Medication 12.5 MILLIGRAM(S): at 18:58

## 2019-03-04 RX ADMIN — PIPERACILLIN AND TAZOBACTAM 25 GRAM(S): 4; .5 INJECTION, POWDER, LYOPHILIZED, FOR SOLUTION INTRAVENOUS at 18:58

## 2019-03-04 RX ADMIN — ATORVASTATIN CALCIUM 80 MILLIGRAM(S): 80 TABLET, FILM COATED ORAL at 22:19

## 2019-03-04 RX ADMIN — APIXABAN 2.5 MILLIGRAM(S): 2.5 TABLET, FILM COATED ORAL at 18:58

## 2019-03-04 NOTE — DIETITIAN INITIAL EVALUATION ADULT. - PHYSICAL APPEARANCE
BMI 27.  NFPE- severe muscle wasting- temple, clavicle, shoulder.  Moderate muscle wasting- orbital, buccal.  Mild edema noted at b/l lower extremities and right arm./underweight

## 2019-03-04 NOTE — DIETITIAN INITIAL EVALUATION ADULT. - OTHER INFO
Pt with cholecystostomy, s/p septic shock ,CHF, TONY- improving. Pt reports decreased po intake at meals- dislikes puree food and Nepro supplements.  Pt lives alone and reports consuming 1.5-2 meals daily (small portions).  Pt also with unintentional wt loss due to poor appetite.  Attempted to obtain food preferences and encouraged high protein intake at meals.  Unsure pts level of understanding at this time.  Heart Failure nutrition guidelines left at bedside.

## 2019-03-04 NOTE — PROGRESS NOTE ADULT - SUBJECTIVE AND OBJECTIVE BOX
[facility]  INFECTIOUS DISEASES AND INTERNAL MEDICINE at [place]  =======================================================  Rios Goss MD  Diplomates American Board of Internal Medicine and Infectious Diseases  =======================================================    REYMARIIA BERMUDEZ [id number]    Follow up: cholecystitis    No fever or chills  No complaints.     Allergies:  Drug Allergies Not Recorded  eggs (Other)      Antibiotics:  piperacillin/tazobactam IVPB. 3.375 Gram(s) IV Intermittent every 12 hours       REVIEW OF SYSTEMS:  CONSTITUTIONAL:  No Fever or chills  HEENT:   No diplopia or blurred vision.  No earache, sore throat or runny nose.  CARDIOVASCULAR:  No chest pain  RESPIRATORY:  No cough, shortness of breath  GASTROINTESTINAL:  No nausea, vomiting or diarrhea.  GENITOURINARY:  No dysuria, frequency or urgency.   MUSCULOSKELETAL:  no joint aches, no muscle pain  SKIN:  No change in skin, hair or nails.  NEUROLOGIC:  No paresthesias, fasciculations  PSYCHIATRIC:  No disorder of thought or mood.  ENDOCRINE:  No heat or cold intolerance  HEMATOLOGICAL:  No easy bruising or bleeding.       Physical Exam:  Vital Signs Last 24 Hrs  T(C): 36.4 (04 Mar 2019 09:42), Max: 36.7 (03 Mar 2019 15:56)  T(F): 97.5 (04 Mar 2019 09:42), Max: 98.1 (03 Mar 2019 15:56)  HR: 65 (04 Mar 2019 09:42) (56 - 85)  BP: 122/75 (04 Mar 2019 09:42) (122/60 - 140/84)  RR: 15 (04 Mar 2019 09:42) (15 - 18)  SpO2: 91% (04 Mar 2019 09:42) (91% - 99%)      GEN: NAD, pleasant  HEENT: normocephalic and atraumatic. EOMI. PERRL.  Anicteric   NECK: Supple.   LUNGS: Clear to auscultation.  HEART: Regular rate and rhythm   ABDOMEN: Soft, nontender, and nondistended.  Positive bowel sounds.  + Perc drain  : No CVA tenderness  EXTREMITIES: Without any edema.  MSK: no joint swelling  NEUROLOGIC: No focal deficits  PSYCHIATRIC: Appropriate affect .  SKIN: No Rash      Labs:  03-04    141  |  100  |  53.0<H>  ----------------------------<  105  3.8   |  31.0<H>  |  1.67<H>    Ca    8.7      04 Mar 2019 05:34               10.8   8.5   )-----------( 228      ( 03 Mar 2019 06:25 )             34.0       RECENT CULTURES:  02-28 @ 19:49 .Blood     No growth at 48 hours      02-28 @ 09:36 .Urine     No growth

## 2019-03-04 NOTE — CHART NOTE - NSCHARTNOTEFT_GEN_A_CORE
Upon Nutritional Assessment by the Registered Dietitian your patient was determined to meet criteria / has evidence of the following diagnosis/diagnoses:          [ ]  Mild Protein Calorie Malnutrition        [ ]  Moderate Protein Calorie Malnutrition        [x ] Severe Protein Calorie Malnutrition        [ ] Unspecified Protein Calorie Malnutrition        [ ] Underweight / BMI <19        [ ] Morbid Obesity / BMI > 40      Findings as based on:  •  Comprehensive nutrition assessment and consultation  •  Calorie counts (nutrient intake analysis)  •  Food acceptance and intake status from observations by staff  •  Follow up  •  Patient education  •  Intervention secondary to interdisciplinary rounds  •   concerns      Treatment:    The following diet has been recommended:  DC Nepro- pt dislikes.  RX: Ensure TID and Ensure Pudding TID    PROVIDER Section:     By signing this assessment you are acknowledging and agree with the diagnosis/diagnoses assigned by the Registered Dietitian    Comments:

## 2019-03-04 NOTE — PROGRESS NOTE ADULT - SUBJECTIVE AND OBJECTIVE BOX
Subjective:84yMale with whitehead cath after bedside cysto and whitehead insertion last week. Urine remains clear, concentrated. No hematuria.  pt has no complaints at this time, feeling well.    Whitehead: concentrated urine    Vital Signs Last 24 Hrs  T(C): 36.4 (04 Mar 2019 09:42), Max: 36.7 (03 Mar 2019 15:56)  T(F): 97.5 (04 Mar 2019 09:42), Max: 98.1 (03 Mar 2019 15:56)  HR: 65 (04 Mar 2019 09:42) (56 - 85)  BP: 122/75 (04 Mar 2019 09:42) (122/60 - 140/84)  BP(mean): --  RR: 15 (04 Mar 2019 09:42) (15 - 18)  SpO2: 91% (04 Mar 2019 09:42) (91% - 99%)  I&O's Detail    03 Mar 2019 07:01  -  04 Mar 2019 07:00  --------------------------------------------------------  IN:    Oral Fluid: 180 mL    Solution: 75 mL  Total IN: 255 mL    OUT:    Drain: 90 mL    Indwelling Catheter - Urethral: 725 mL  Total OUT: 815 mL    Total NET: -560 mL          Labs:                        10.8   8.5   )-----------( 228      ( 03 Mar 2019 06:25 )             34.0     03-04    141  |  100  |  53.0<H>  ----------------------------<  105  3.8   |  31.0<H>  |  1.67<H>    Ca    8.7      04 Mar 2019 05:34

## 2019-03-04 NOTE — DIETITIAN INITIAL EVALUATION ADULT. - SIGNS/SYMPTOMS
as evidenced by pt meeting <75% est energy intake > 1 mo, 5% wt loss x 1.5 mo, severe muscle wasting

## 2019-03-04 NOTE — PROGRESS NOTE ADULT - SUBJECTIVE AND OBJECTIVE BOX
Patient: MARIIA REY [id number] 84y Male                           Internal Medicine Hospitalist Progress Note    Initial HPI:  83 yo male PMH Afib (home on eliquis), CAD s/p PCI to LAD (2018), CHFrEF (EF 20-25%), life vest, BPH, HTN, HLD, transferred to [facility] from [facility] for acute cholecystitis.  HIDA showed probable acute cholecystitis and he was deemed a poor surgical candidate so he underwent percutaneous cholecystostomy tube placement on .  He had a brief episode of hypotension requiring dopamine, which precipitated afib with RVR, and the dopamine was subsequently discontinued.  Course complicated by oliguric renal failure.  [place] dante catheter placed on .  Transferred to [facility] on  due to worsening renal failure with anticipated need for CVVHD.   Schwanz dante catheter removed;  cholecystosomy tube still draining;   kidney functioning improving, patient downgraded to medical service     Interval History:  More awake today.  Denies complaints.  No SOB.  Noted SaO2 ? 80s on RA per RN.  No chest pain / palpitations. No fever, chills.  No urinary frequency, urgency, dysuria.  No diarrhea.  No cough. No additional complaints.     ____________________PHYSICAL EXAM:  Vitals reviewed as indicated below  GENERAL:  NAD Arousable, Oriented x 2 to person, place.   HEENT: NCAT  CARDIOVASCULAR:  S1, S2  LUNGS: coarse BS b/l  ABDOMEN:  soft, (-) tenderness, (-) distension, (+) bowel sounds, (-) guarding, (-) rebound (-) rigidity.  Drain in place.   EXTREMITIES:  no cyanosis / clubbing / edema.   ____________________    VITALS:  Vital Signs Last 24 Hrs  T(C): 36.4 (04 Mar 2019 09:42), Max: 36.7 (03 Mar 2019 15:56)  T(F): 97.5 (04 Mar 2019 09:42), Max: 98.1 (03 Mar 2019 15:56)  HR: 65 (04 Mar 2019 09:42) (56 - 85)  BP: 122/75 (04 Mar 2019 09:42) (122/60 - 140/84)  BP(mean): --  RR: 15 (04 Mar 2019 09:42) (15 - 18)  SpO2: 91% (04 Mar 2019 09:42) (91% - 99%) Daily     Daily Weight in k (04 Mar 2019 10:00)  CAPILLARY BLOOD GLUCOSE        I&O's Summary    03 Mar 2019 07:01  -  04 Mar 2019 07:00  --------------------------------------------------------  IN: 255 mL / OUT: 815 mL / NET: -560 mL        LABS:                        10.8   8.5   )-----------( 228      ( 03 Mar 2019 06:25 )             34.0     03-    141  |  100  |  53.0<H>  ----------------------------<  105  3.8   |  31.0<H>  |  1.67<H>    Ca    8.7      04 Mar 2019 05:34                  MEDICATIONS:  apixaban 2.5 milliGRAM(s) Oral every 12 hours  atorvastatin 80 milliGRAM(s) Oral at bedtime  citalopram 10 milliGRAM(s) Oral daily  clopidogrel Tablet 75 milliGRAM(s) Oral daily  guaiFENesin    Syrup 100 milliGRAM(s) Oral every 8 hours  metoprolol tartrate 12.5 milliGRAM(s) Oral every 12 hours  multiple electrolytes Injection Type 1 1000 milliLiter(s) IV Continuous <Continuous>  piperacillin/tazobactam IVPB. 3.375 Gram(s) IV Intermittent every 12 hours  saccharomyces boulardii 250 milliGRAM(s) Oral two times a day

## 2019-03-04 NOTE — PROGRESS NOTE ADULT - ASSESSMENT
83yo male s/p bedside cysto, whitehead cath insertion for malpositioned whitehead, injury to prostatic urtethra.  _ cotinue whitehead cath  - oob to chair  - continue hytrin 83yo male s/p bedside cysto, whitehead cath insertion for malpositioned whitehead, injury to prostatic urethra.  _ cotinue whitehead cath  - oob to chair  - continue hytrin

## 2019-03-04 NOTE — PROGRESS NOTE ADULT - ASSESSMENT
85 yo male with hx of afib on eliquis, CAD s/p PCI to LAD in Nov 2018, CHFrEF, life vest, BPH, HTN, HLD, [place] [facility] [place] [facility] [place]stit[place].  s/p perc drain      acute cholecystitis  s/p perc drain    - Blood cultures no growth  - Drain fluid culture with GNR  - Continue Zosyn  - follow up all outstanding cultures  - trend temperature and WBC curve  - repeat cultures from blood and all sources if febrile.       Will Follow

## 2019-03-04 NOTE — PROGRESS NOTE ADULT - ASSESSMENT
>Acute Cholecystitis s/p cholecystostomy.  Per surgery, outpatient f/u for removal.  Continue Abx.  Surgeon  from [facility] where tube placed;   > Hypoxia - ? CHF.  Repeat CXR, resume diuretics.     >TONY due to ATN, underlying CKD.  - Cr improving.  Discussed with Renal.  Continue Garcia as pt hs failed voiding trial.    >s/p Septic Shock - now off pressors.  BP stable.  Supportive care  >Chronic systolic congestive heart failure/ baseline EF 20-25%;/ hx of cad s/p MI in dec 2018 / has life vest at home.  Adjust metoprolol.   Resume Lasix  > Atrial fibrillation - rate stable on Bblocker.  Eliquis  > physical deconditioning : -pt eval     Plan for [facility] placement.  daughter Luana ([phone])

## 2019-03-05 LAB
ANION GAP SERPL CALC-SCNC: 13 MMOL/L — SIGNIFICANT CHANGE UP (ref 5–17)
BUN SERPL-MCNC: 46 MG/DL — HIGH (ref 8–20)
CALCIUM SERPL-MCNC: 8.6 MG/DL — SIGNIFICANT CHANGE UP (ref 8.6–10.2)
CHLORIDE SERPL-SCNC: 99 MMOL/L — SIGNIFICANT CHANGE UP (ref 98–107)
CO2 SERPL-SCNC: 28 MMOL/L — SIGNIFICANT CHANGE UP (ref 22–29)
CREAT SERPL-MCNC: 1.25 MG/DL — SIGNIFICANT CHANGE UP (ref 0.5–1.3)
CULTURE RESULTS: SIGNIFICANT CHANGE UP
GLUCOSE SERPL-MCNC: 95 MG/DL — SIGNIFICANT CHANGE UP (ref 70–115)
HCT VFR BLD CALC: 35.8 % — LOW (ref 42–52)
HGB BLD-MCNC: 11.3 G/DL — LOW (ref 14–18)
MCHC RBC-ENTMCNC: 31.6 G/DL — LOW (ref 32–36)
MCHC RBC-ENTMCNC: 31.6 PG — HIGH (ref 27–31)
MCV RBC AUTO: 100 FL — HIGH (ref 80–94)
PLATELET # BLD AUTO: 237 K/UL — SIGNIFICANT CHANGE UP (ref 150–400)
POTASSIUM SERPL-MCNC: 4.1 MMOL/L — SIGNIFICANT CHANGE UP (ref 3.5–5.3)
POTASSIUM SERPL-SCNC: 4.1 MMOL/L — SIGNIFICANT CHANGE UP (ref 3.5–5.3)
RBC # BLD: 3.58 M/UL — LOW (ref 4.6–6.2)
RBC # FLD: 15.6 % — SIGNIFICANT CHANGE UP (ref 11–15.6)
SODIUM SERPL-SCNC: 140 MMOL/L — SIGNIFICANT CHANGE UP (ref 135–145)
SPECIMEN SOURCE: SIGNIFICANT CHANGE UP
WBC # BLD: 9.9 K/UL — SIGNIFICANT CHANGE UP (ref 4.8–10.8)
WBC # FLD AUTO: 9.9 K/UL — SIGNIFICANT CHANGE UP (ref 4.8–10.8)

## 2019-03-05 PROCEDURE — 99233 SBSQ HOSP IP/OBS HIGH 50: CPT

## 2019-03-05 RX ORDER — FUROSEMIDE 40 MG
40 TABLET ORAL DAILY
Qty: 0 | Refills: 0 | Status: DISCONTINUED | OUTPATIENT
Start: 2019-03-05 | End: 2019-03-06

## 2019-03-05 RX ORDER — TAMSULOSIN HYDROCHLORIDE 0.4 MG/1
0.4 CAPSULE ORAL AT BEDTIME
Qty: 0 | Refills: 0 | Status: DISCONTINUED | OUTPATIENT
Start: 2019-03-05 | End: 2019-03-06

## 2019-03-05 RX ORDER — CEFTRIAXONE 500 MG/1
1 INJECTION, POWDER, FOR SOLUTION INTRAMUSCULAR; INTRAVENOUS EVERY 24 HOURS
Qty: 0 | Refills: 0 | Status: DISCONTINUED | OUTPATIENT
Start: 2019-03-05 | End: 2019-03-06

## 2019-03-05 RX ORDER — METRONIDAZOLE 500 MG
500 TABLET ORAL EVERY 8 HOURS
Qty: 0 | Refills: 0 | Status: DISCONTINUED | OUTPATIENT
Start: 2019-03-05 | End: 2019-03-06

## 2019-03-05 RX ADMIN — ATORVASTATIN CALCIUM 80 MILLIGRAM(S): 80 TABLET, FILM COATED ORAL at 21:50

## 2019-03-05 RX ADMIN — Medication 500 MILLIGRAM(S): at 14:59

## 2019-03-05 RX ADMIN — Medication 40 MILLIGRAM(S): at 17:57

## 2019-03-05 RX ADMIN — Medication 500 MILLIGRAM(S): at 21:50

## 2019-03-05 RX ADMIN — Medication 12.5 MILLIGRAM(S): at 06:26

## 2019-03-05 RX ADMIN — Medication 100 MILLIGRAM(S): at 14:59

## 2019-03-05 RX ADMIN — CEFTRIAXONE 100 GRAM(S): 500 INJECTION, POWDER, FOR SOLUTION INTRAMUSCULAR; INTRAVENOUS at 12:13

## 2019-03-05 RX ADMIN — Medication 100 MILLIGRAM(S): at 21:51

## 2019-03-05 RX ADMIN — Medication 250 MILLIGRAM(S): at 17:53

## 2019-03-05 RX ADMIN — APIXABAN 2.5 MILLIGRAM(S): 2.5 TABLET, FILM COATED ORAL at 06:26

## 2019-03-05 RX ADMIN — APIXABAN 2.5 MILLIGRAM(S): 2.5 TABLET, FILM COATED ORAL at 17:53

## 2019-03-05 RX ADMIN — CITALOPRAM 10 MILLIGRAM(S): 10 TABLET, FILM COATED ORAL at 12:13

## 2019-03-05 RX ADMIN — Medication 20 MILLIGRAM(S): at 06:27

## 2019-03-05 RX ADMIN — Medication 100 MILLIGRAM(S): at 06:28

## 2019-03-05 RX ADMIN — Medication 12.5 MILLIGRAM(S): at 17:53

## 2019-03-05 RX ADMIN — CLOPIDOGREL BISULFATE 75 MILLIGRAM(S): 75 TABLET, FILM COATED ORAL at 12:13

## 2019-03-05 RX ADMIN — PIPERACILLIN AND TAZOBACTAM 25 GRAM(S): 4; .5 INJECTION, POWDER, LYOPHILIZED, FOR SOLUTION INTRAVENOUS at 06:24

## 2019-03-05 RX ADMIN — Medication 250 MILLIGRAM(S): at 06:27

## 2019-03-05 RX ADMIN — TAMSULOSIN HYDROCHLORIDE 0.4 MILLIGRAM(S): 0.4 CAPSULE ORAL at 21:56

## 2019-03-05 NOTE — PROGRESS NOTE ADULT - ASSESSMENT
85 yo male with hx of afib on eliquis, CAD s/p PCI to LAD in Nov 2018, CHFrEF, life vest, BPH, HTN, HLD, transferred to [place] from [facility] for acute cholecystitis.  s/p perc drain      acute cholecystitis  s/p perc drain 2/25/19    - Blood cultures no growth  - Drain fluid culture with E coli   - E coli sensitive to Zosyn and Ceftriaxone  - D/C Zosyn  - Start Ceftriaxone 1gm IV q 24hours  - Start Flagyl 500mg PO q 8hours  - On D/C can switch Ceftriaxone to oral Vantin  - follow up all outstanding cultures  - trend temperature and WBC curve  - Plan for antibiotics till 3/10/19      Will sign off. Please call PRN.

## 2019-03-05 NOTE — PROGRESS NOTE ADULT - SUBJECTIVE AND OBJECTIVE BOX
Patient: MARIIA REY [id number] 84y Male                           Internal Medicine Hospitalist Progress Note    Initial HPI:  83 yo male PMH Afib (home on eliquis), CAD s/p PCI to LAD (2018), CHFrEF (EF 20-25%), life vest, BPH, HTN, HLD, transferred to [facility] for acute cholecystitis.  HIDA showed probable acute cholecystitis and he was deemed a poor surgical candidate so he underwent percutaneous cholecystostomy tube placement on .  He had a brief episode of hypotension requiring dopamine, which precipitated afib with RVR, and the dopamine was subsequently discontinued.  Course complicated by oliguric renal failure.  [place] dante catheter placed on .  Transferred to [facility] on  due to worsening renal failure with anticipated need for CVVHD.   Schwanz dante catheter removed;  cholecystosomy tube still draining;   kidney functioning improving, patient downgraded to medical service     Interval History:  Much more awake today.  Seen with aide at bedside.  Taking po intake.     ____________________PHYSICAL EXAM:  Vitals reviewed as indicated below  GENERAL:  NAD Arousable, Oriented x 2 to person, place.   HEENT: NCAT  CARDIOVASCULAR:  S1, S2  LUNGS: coarse BS b/l  ABDOMEN:  soft, (-) tenderness, (-) distension, (+) bowel sounds, (-) guarding, (-) rebound (-) rigidity.  Drain in place.   EXTREMITIES:  no cyanosis / clubbing / edema.   ____________________    VITALS:  Vital Signs Last 24 Hrs  T(C): 36.4 (05 Mar 2019 07:25), Max: 37.1 (05 Mar 2019 00:02)  T(F): 97.5 (05 Mar 2019 07:25), Max: 98.7 (05 Mar 2019 00:02)  HR: 57 (05 Mar 2019 07:25) (50 - 87)  BP: 126/78 (05 Mar 2019 07:25) (100/58 - 132/85)  BP(mean): --  RR: 18 (05 Mar 2019 07:25) (17 - 19)  SpO2: 100% (04 Mar 2019 21:37) (93% - 100%) Daily     Daily Weight in k (04 Mar 2019 10:00)  CAPILLARY BLOOD GLUCOSE        I&O's Summary    04 Mar 2019 07:01  -  05 Mar 2019 07:00  --------------------------------------------------------  IN: 260 mL / OUT: 945 mL / NET: -685 mL        LABS:                        11.3   9.9   )-----------( 237      ( 05 Mar 2019 07:25 )             35.8     03-05    140  |  99  |  46.0<H>  ----------------------------<  95  4.1   |  28.0  |  1.25    Ca    8.6      05 Mar 2019 07:25                  MEDICATIONS:  apixaban 2.5 milliGRAM(s) Oral every 12 hours  atorvastatin 80 milliGRAM(s) Oral at bedtime  cefTRIAXone   IVPB 1 Gram(s) IV Intermittent every 24 hours  citalopram 10 milliGRAM(s) Oral daily  clopidogrel Tablet 75 milliGRAM(s) Oral daily  furosemide    Tablet 40 milliGRAM(s) Oral daily  guaiFENesin    Syrup 100 milliGRAM(s) Oral every 8 hours  metoprolol tartrate 12.5 milliGRAM(s) Oral every 12 hours  metroNIDAZOLE    Tablet 500 milliGRAM(s) Oral every 8 hours  multiple electrolytes Injection Type 1 1000 milliLiter(s) IV Continuous <Continuous>  saccharomyces boulardii 250 milliGRAM(s) Oral two times a day

## 2019-03-05 NOTE — PROGRESS NOTE ADULT - SUBJECTIVE AND OBJECTIVE BOX
[facility]  INFECTIOUS DISEASES AND INTERNAL MEDICINE at [place]  =======================================================  Rios Goss MD  Diplomates American Board of Internal Medicine and Infectious Diseases  =======================================================    REYMARIIA BERMUDEZ [id number]    Follow up: cholecystitis    No fever or chills  No complaints.     Allergies:  Drug Allergies Not Recorded  eggs (Other)      Antibiotics:  piperacillin/tazobactam IVPB. 3.375 Gram(s) IV Intermittent every 12 hours       REVIEW OF SYSTEMS:  CONSTITUTIONAL:  No Fever or chills  HEENT:   No diplopia or blurred vision.  No earache, sore throat or runny nose.  CARDIOVASCULAR:  No chest pain  RESPIRATORY:  No cough, shortness of breath  GASTROINTESTINAL:  No nausea, vomiting or diarrhea.  GENITOURINARY:  No dysuria, frequency or urgency.   MUSCULOSKELETAL:  no joint aches, no muscle pain  SKIN:  No change in skin, hair or nails.  NEUROLOGIC:  No paresthesias, fasciculations  PSYCHIATRIC:  No disorder of thought or mood.  ENDOCRINE:  No heat or cold intolerance  HEMATOLOGICAL:  No easy bruising or bleeding.       Physical Exam:  Vital Signs Last 24 Hrs  T(C): 36.4 (05 Mar 2019 07:25), Max: 37.1 (05 Mar 2019 00:02)  T(F): 97.5 (05 Mar 2019 07:25), Max: 98.7 (05 Mar 2019 00:02)  HR: 57 (05 Mar 2019 07:25) (50 - 87)  BP: 126/78 (05 Mar 2019 07:25) (100/58 - 132/85)  RR: 18 (05 Mar 2019 07:25) (15 - 19)  SpO2: 100% (04 Mar 2019 21:37) (91% - 100%)      GEN: NAD, pleasant  HEENT: normocephalic and atraumatic. EOMI. PERRL.  Anicteric   NECK: Supple.   LUNGS: Clear to auscultation.  HEART: Regular rate and rhythm   ABDOMEN: Soft, nontender, and nondistended.  Positive bowel sounds.  + Perc drain  : No CVA tenderness  EXTREMITIES: Without any edema.  MSK: no joint swelling  NEUROLOGIC: No focal deficits  PSYCHIATRIC: Appropriate affect .  SKIN: No Rash      Labs:  03-05    140  |  99  |  46.0<H>  ----------------------------<  95  4.1   |  28.0  |  1.25    Ca    8.6      05 Mar 2019 07:25                 11.3   9.9   )-----------( 237      ( 05 Mar 2019 07:25 )             35.8       RECENT CULTURES:  02-28 @ 19:49 .Blood     No growth at 48 hours      02-28 @ 09:36 .Urine     No growth          EXAM:  XR CHEST PORTABLE URGENT 1V                        PROCEDURE DATE:  03/04/2019    INTERPRETATION:  History: Hypoxia  Portable chest radiograph is compared to 3/2/2019.  The heart is not enlarged. The trachea is midline. There ispulmonary   vascular congestion with left pleural effusion. There is linear   atelectasis or infiltrate at the right lung base. There is osteopenia of   the bony structures with bilateral shoulder prostheses.  Impression:   Redemonstration of pulmonary vascular congestion with   suggestion of left pleural effusion. Mild increased density at the right   lung base suggesting atelectasis versus infiltrate.

## 2019-03-05 NOTE — PROGRESS NOTE ADULT - NSHPATTENDINGPLANDISCUSS_GEN_ALL_CORE
pt, daughter at bedside, rn , sw
pt, daughter at bedside, rn , sw, ID
Dr Bey
Dr Bey
PA, patient, family
PA
PA

## 2019-03-05 NOTE — PROGRESS NOTE ADULT - ASSESSMENT
>Acute Cholecystitis s/p cholecystostomy.  Per surgery, outpatient f/u for removal.  Continue Abx -> Rocephin and Flagyl until 3/10, can change to po on discharge.  [place] [facility] [place];   > Hypoxia - Acute on Chronic systolic congestive heart failure/ baseline EF 20-25%.  Lifevest.  Hx of cad s/p MI in dec 2018 - Hypoxia improved.  Continue Lasix, BBlocker.  > CAD - continue Plavix, BBlocker, Statin.   >TONY due to ATN, underlying CKD.  - Cr improving.  Discussed with Renal.  Continue Garcia as pt hs failed voiding trial.    >s/p Septic Shock - now off pressors.  BP stable.  Supportive care  > Atrial fibrillation - rate stable on Bblocker.  Eliquis  > physical deconditioning : Plan BISHOP.      Plan for BISHOP placement.  Message left for daughter Luana ([phone])

## 2019-03-06 ENCOUNTER — TRANSCRIPTION ENCOUNTER (OUTPATIENT)
Age: 84
End: 2019-03-06

## 2019-03-06 VITALS
OXYGEN SATURATION: 98 % | HEART RATE: 83 BPM | DIASTOLIC BLOOD PRESSURE: 92 MMHG | TEMPERATURE: 98 F | RESPIRATION RATE: 18 BRPM | SYSTOLIC BLOOD PRESSURE: 140 MMHG

## 2019-03-06 LAB
ANION GAP SERPL CALC-SCNC: 11 MMOL/L — SIGNIFICANT CHANGE UP (ref 5–17)
BUN SERPL-MCNC: 42 MG/DL — HIGH (ref 8–20)
CALCIUM SERPL-MCNC: 8.5 MG/DL — LOW (ref 8.6–10.2)
CHLORIDE SERPL-SCNC: 99 MMOL/L — SIGNIFICANT CHANGE UP (ref 98–107)
CO2 SERPL-SCNC: 30 MMOL/L — HIGH (ref 22–29)
CREAT SERPL-MCNC: 1.12 MG/DL — SIGNIFICANT CHANGE UP (ref 0.5–1.3)
GLUCOSE SERPL-MCNC: 108 MG/DL — SIGNIFICANT CHANGE UP (ref 70–115)
POTASSIUM SERPL-MCNC: 4.2 MMOL/L — SIGNIFICANT CHANGE UP (ref 3.5–5.3)
POTASSIUM SERPL-SCNC: 4.2 MMOL/L — SIGNIFICANT CHANGE UP (ref 3.5–5.3)
SODIUM SERPL-SCNC: 140 MMOL/L — SIGNIFICANT CHANGE UP (ref 135–145)

## 2019-03-06 PROCEDURE — 92610 EVALUATE SWALLOWING FUNCTION: CPT

## 2019-03-06 PROCEDURE — 83605 ASSAY OF LACTIC ACID: CPT

## 2019-03-06 PROCEDURE — 84156 ASSAY OF PROTEIN URINE: CPT

## 2019-03-06 PROCEDURE — 84300 ASSAY OF URINE SODIUM: CPT

## 2019-03-06 PROCEDURE — 36415 COLL VENOUS BLD VENIPUNCTURE: CPT

## 2019-03-06 PROCEDURE — 83735 ASSAY OF MAGNESIUM: CPT

## 2019-03-06 PROCEDURE — 85610 PROTHROMBIN TIME: CPT

## 2019-03-06 PROCEDURE — 83935 ASSAY OF URINE OSMOLALITY: CPT

## 2019-03-06 PROCEDURE — 81001 URINALYSIS AUTO W/SCOPE: CPT

## 2019-03-06 PROCEDURE — 92526 ORAL FUNCTION THERAPY: CPT

## 2019-03-06 PROCEDURE — 97163 PT EVAL HIGH COMPLEX 45 MIN: CPT

## 2019-03-06 PROCEDURE — 84484 ASSAY OF TROPONIN QUANT: CPT

## 2019-03-06 PROCEDURE — 97116 GAIT TRAINING THERAPY: CPT

## 2019-03-06 PROCEDURE — 83880 ASSAY OF NATRIURETIC PEPTIDE: CPT

## 2019-03-06 PROCEDURE — 84100 ASSAY OF PHOSPHORUS: CPT

## 2019-03-06 PROCEDURE — 97530 THERAPEUTIC ACTIVITIES: CPT

## 2019-03-06 PROCEDURE — 80053 COMPREHEN METABOLIC PANEL: CPT

## 2019-03-06 PROCEDURE — 93306 TTE W/DOPPLER COMPLETE: CPT

## 2019-03-06 PROCEDURE — 99239 HOSP IP/OBS DSCHRG MGMT >30: CPT

## 2019-03-06 PROCEDURE — 76775 US EXAM ABDO BACK WALL LIM: CPT

## 2019-03-06 PROCEDURE — 71045 X-RAY EXAM CHEST 1 VIEW: CPT

## 2019-03-06 PROCEDURE — 85730 THROMBOPLASTIN TIME PARTIAL: CPT

## 2019-03-06 PROCEDURE — 87086 URINE CULTURE/COLONY COUNT: CPT

## 2019-03-06 PROCEDURE — 93005 ELECTROCARDIOGRAM TRACING: CPT

## 2019-03-06 PROCEDURE — 85027 COMPLETE CBC AUTOMATED: CPT

## 2019-03-06 PROCEDURE — 82570 ASSAY OF URINE CREATININE: CPT

## 2019-03-06 PROCEDURE — 87040 BLOOD CULTURE FOR BACTERIA: CPT

## 2019-03-06 PROCEDURE — 84105 ASSAY OF URINE PHOSPHORUS: CPT

## 2019-03-06 PROCEDURE — 80048 BASIC METABOLIC PNL TOTAL CA: CPT

## 2019-03-06 RX ORDER — LISINOPRIL 2.5 MG/1
1 TABLET ORAL
Qty: 0 | Refills: 0 | COMMUNITY

## 2019-03-06 RX ORDER — METOPROLOL TARTRATE 50 MG
1 TABLET ORAL
Qty: 0 | Refills: 0 | COMMUNITY

## 2019-03-06 RX ORDER — TAMSULOSIN HYDROCHLORIDE 0.4 MG/1
1 CAPSULE ORAL
Qty: 0 | Refills: 0 | COMMUNITY
Start: 2019-03-06

## 2019-03-06 RX ORDER — CEFPODOXIME PROXETIL 100 MG
1 TABLET ORAL
Qty: 0 | Refills: 0 | COMMUNITY

## 2019-03-06 RX ORDER — METRONIDAZOLE 500 MG
1 TABLET ORAL
Qty: 0 | Refills: 0 | COMMUNITY

## 2019-03-06 RX ORDER — METOPROLOL TARTRATE 50 MG
12.5 TABLET ORAL
Qty: 0 | Refills: 0 | COMMUNITY
Start: 2019-03-06

## 2019-03-06 RX ORDER — APIXABAN 2.5 MG/1
1 TABLET, FILM COATED ORAL
Qty: 0 | Refills: 0 | COMMUNITY

## 2019-03-06 RX ORDER — LISINOPRIL 2.5 MG/1
1 TABLET ORAL
Qty: 0 | Refills: 0 | COMMUNITY
Start: 2019-03-06

## 2019-03-06 RX ORDER — APIXABAN 2.5 MG/1
5 TABLET, FILM COATED ORAL EVERY 12 HOURS
Qty: 0 | Refills: 0 | Status: DISCONTINUED | OUTPATIENT
Start: 2019-03-06 | End: 2019-03-06

## 2019-03-06 RX ORDER — APIXABAN 2.5 MG/1
1 TABLET, FILM COATED ORAL
Qty: 0 | Refills: 0 | COMMUNITY
Start: 2019-03-06

## 2019-03-06 RX ADMIN — Medication 12.5 MILLIGRAM(S): at 05:25

## 2019-03-06 RX ADMIN — APIXABAN 2.5 MILLIGRAM(S): 2.5 TABLET, FILM COATED ORAL at 05:26

## 2019-03-06 RX ADMIN — Medication 100 MILLIGRAM(S): at 13:14

## 2019-03-06 RX ADMIN — Medication 500 MILLIGRAM(S): at 13:14

## 2019-03-06 RX ADMIN — Medication 500 MILLIGRAM(S): at 05:26

## 2019-03-06 RX ADMIN — Medication 40 MILLIGRAM(S): at 05:25

## 2019-03-06 RX ADMIN — CITALOPRAM 10 MILLIGRAM(S): 10 TABLET, FILM COATED ORAL at 12:12

## 2019-03-06 RX ADMIN — CEFTRIAXONE 100 GRAM(S): 500 INJECTION, POWDER, FOR SOLUTION INTRAMUSCULAR; INTRAVENOUS at 12:12

## 2019-03-06 RX ADMIN — CLOPIDOGREL BISULFATE 75 MILLIGRAM(S): 75 TABLET, FILM COATED ORAL at 12:12

## 2019-03-06 RX ADMIN — Medication 250 MILLIGRAM(S): at 05:25

## 2019-03-06 RX ADMIN — Medication 100 MILLIGRAM(S): at 05:26

## 2019-03-06 NOTE — DISCHARGE NOTE ADULT - PLAN OF CARE
stable for discharge It is important to see your primary physician as well as the physicians noted below within the next week to perform a comprehensive medical review.  Call their offices for an appointment as soon as you leave the hospital.  If you do not have a primary physician, contact the [facility] [phone] located on [address] [place] [address].  Your medical issues appear to be stable at this time, but if your symptoms recur or worsen, contact your physicians and/or return to the hospital if necessary.  If you encounter any issues or questions with your medication, call your physicians before stopping the medication.  Do not drive.  Limit your diet to 2 grams of sodium daily.

## 2019-03-06 NOTE — PROGRESS NOTE ADULT - PROVIDER SPECIALTY LIST ADULT
Hospitalist
Infectious Disease
Nephrology
Urology
MICU
Nephrology
Hospitalist

## 2019-03-06 NOTE — DISCHARGE NOTE ADULT - PATIENT PORTAL LINK FT
You can access the [facility] Patient Portal, offered by [facility], by registering with the following website: [email or web address]

## 2019-03-06 NOTE — PROGRESS NOTE ADULT - SUBJECTIVE AND OBJECTIVE BOX
Patient: MARIIA REY [id number] 84y Male                           Internal Medicine Hospitalist Progress Note    Initial HPI:  85 yo male PMH Afib (home on eliquis), CAD s/p PCI to LAD (Nov 2018), CHFrEF (EF 20-25%), life vest, BPH, HTN, HLD, transferred to [facility] from [facility] for acute cholecystitis.  HIDA showed probable acute cholecystitis and he was deemed a poor surgical candidate so he underwent percutaneous cholecystostomy tube placement on 2/25.  He had a brief episode of hypotension requiring dopamine, which precipitated afib with RVR, and the dopamine was subsequently discontinued.  Course complicated by oliguric renal failure.  [place] dante catheter placed on 2/26.  Transferred to [facility] on 2/27 due to worsening renal failure with anticipated need for CVVHD.   [place] dante catheter removed;  cholecystosomy tube still draining;   kidney functioning improving, patient downgraded to medical service.  Restarted on diuretics.  SaO2 now 94-98% on RA.      Interval History:  Pt more alert, seen with aide at bedside.  Taking po intake.     ____________________PHYSICAL EXAM:  Vitals reviewed as indicated below  GENERAL:  NAD Arousable, Oriented x 2 to person, place.   HEENT: NCAT  CARDIOVASCULAR:  S1, S2  LUNGS: CTAB  ABDOMEN:  soft, (-) tenderness, (-) distension, (+) bowel sounds, (-) guarding, (-) rebound (-) rigidity.  Drain in place.   EXTREMITIES:  no cyanosis / clubbing / edema.   ____________________    VITALS:  Vital Signs Last 24 Hrs  T(C): 36.7 (06 Mar 2019 07:38), Max: 36.8 (05 Mar 2019 15:45)  T(F): 98 (06 Mar 2019 07:38), Max: 98.3 (05 Mar 2019 15:45)  HR: 64 (06 Mar 2019 07:38) (64 - 91)  BP: 137/87 (06 Mar 2019 07:38) (128/81 - 144/98)  BP(mean): --  RR: 18 (06 Mar 2019 07:38) (16 - 18)  SpO2: 98% (06 Mar 2019 07:38) (98% - 98%) Daily     Daily   CAPILLARY BLOOD GLUCOSE        I&O's Summary    05 Mar 2019 07:01  -  06 Mar 2019 07:00  --------------------------------------------------------  IN: 0 mL / OUT: 50 mL / NET: -50 mL        LABS:                        11.3   9.9   )-----------( 237      ( 05 Mar 2019 07:25 )             35.8     03-06    140  |  99  |  42.0<H>  ----------------------------<  108  4.2   |  30.0<H>  |  1.12    Ca    8.5<L>      06 Mar 2019 07:13                  MEDICATIONS:  apixaban 2.5 milliGRAM(s) Oral every 12 hours  atorvastatin 80 milliGRAM(s) Oral at bedtime  cefTRIAXone   IVPB 1 Gram(s) IV Intermittent every 24 hours  citalopram 10 milliGRAM(s) Oral daily  clopidogrel Tablet 75 milliGRAM(s) Oral daily  furosemide    Tablet 40 milliGRAM(s) Oral daily  guaiFENesin    Syrup 100 milliGRAM(s) Oral every 8 hours  metoprolol tartrate 12.5 milliGRAM(s) Oral every 12 hours  metroNIDAZOLE    Tablet 500 milliGRAM(s) Oral every 8 hours  multiple electrolytes Injection Type 1 1000 milliLiter(s) IV Continuous <Continuous>  saccharomyces boulardii 250 milliGRAM(s) Oral two times a day  tamsulosin 0.4 milliGRAM(s) Oral at bedtime

## 2019-03-06 NOTE — DISCHARGE NOTE ADULT - CARE PLAN
Principal Discharge DX:	Cholecystitis, acute  Goal:	stable for discharge  Assessment and plan of treatment:	It is important to see your primary physician as well as the physicians noted below within the next week to perform a comprehensive medical review.  Call their offices for an appointment as soon as you leave the hospital.  If you do not have a primary physician, contact the [facility] [phone] located on [address] [place] [address], [place] [address].  Your medical issues appear to be stable at this time, but if your symptoms recur or worsen, contact your physicians and/or return to the hospital if necessary.  If you encounter any issues or questions with your medication, call your physicians before stopping the medication.  Do not drive.  Limit your diet to 2 grams of sodium daily.  Secondary Diagnosis:	Chronic systolic congestive heart failure  Secondary Diagnosis:	Chronic atrial fibrillation  Secondary Diagnosis:	TONY (acute kidney injury)  Secondary Diagnosis:	ATN (acute tubular necrosis)  Secondary Diagnosis:	Alzheimer's dementia without behavioral disturbance, unspecified timing of dementia onset  Secondary Diagnosis:	Severe protein-calorie malnutrition

## 2019-03-06 NOTE — DISCHARGE NOTE ADULT - MEDICATION SUMMARY - MEDICATIONS TO TAKE
I will START or STAY ON the medications listed below when I get home from the hospital:    Flagyl 500 mg oral tablet  -- 1 tab(s) by mouth every 8 hours until 3/10/19  -- Indication: For Cholecystitis, acute    Aspirin Low Dose 81 mg oral tablet, chewable  -- 1 tab(s) by mouth once a day  -- Indication: For CAD (coronary artery disease)    lisinopril 2.5 mg oral tablet  -- 1 tab(s) by mouth once a day  -- Indication: For Hypertension    terazosin 5 mg oral tablet  -- 1 tab(s) by mouth once a day  -- Indication: For BPH    tamsulosin 0.4 mg oral capsule  -- 1 cap(s) by mouth once a day (at bedtime)  -- Indication: For BPH    apixaban 5 mg oral tablet  -- 1 tab(s) by mouth every 12 hours  -- Indication: For Afib    citalopram 10 mg oral tablet  -- 1 tab(s) by mouth once a day  -- Indication: For Anxiety    atorvastatin 80 mg oral tablet  -- 1 tab(s) by mouth once a day  -- Indication: For Hyperlipidemia    clopidogrel 75 mg oral tablet  -- 1 tab(s) by mouth once a day  -- Indication: For CAD (coronary artery disease)    metoprolol  -- 12.5 milligram(s) by mouth every 12 hours  -- Indication: For CAD (coronary artery disease)    Vantin 100 mg oral tablet  -- 1 tab(s) by mouth every 12 hours until 3/10  -- Indication: For Cholecystitis, acute    furosemide 40 mg oral tablet  -- 1 tab(s) by mouth once a day  -- Indication: For ChF

## 2019-03-06 NOTE — DISCHARGE NOTE ADULT - SECONDARY DIAGNOSIS.
Chronic systolic congestive heart failure Chronic atrial fibrillation TONY (acute kidney injury) ATN (acute tubular necrosis) Alzheimer's dementia without behavioral disturbance, unspecified timing of dementia onset Severe protein-calorie malnutrition

## 2019-03-06 NOTE — PROGRESS NOTE ADULT - REASON FOR ADMISSION
renal failure
renal failure
ATN
renal failure

## 2019-03-06 NOTE — PROGRESS NOTE ADULT - ASSESSMENT
>Acute Cholecystitis s/p cholecystostomy.  Per surgery, outpatient f/u for removal.  Continue Abx -> Rocephin and Flagyl until 3/10, can change to po on discharge.  Outpatient Surgeon  from [facility] where tube placed;   > Hypoxia - Acute on Chronic systolic congestive heart failure/ baseline EF 20-25%.  Lifevest.  Hx of cad s/p MI in dec 2018 - Hypoxia improved.  Continue Lasix, BBlocker.  > CAD - continue Plavix, BBlocker, Statin.   >TONY due to ATN, underlying CKD.  - Cr improving.  Discussed with Renal.  Continue Garcia as pt hs failed voiding trial.    >s/p Septic Shock - now off pressors.  BP stable.  Supportive care  > Atrial fibrillation - rate stable on Bblocker.  Eliquis  > physical deconditioning : Plan BISHOP.      Plan for BISHOP placement.  Another message left for daughter Luana ([phone]) >Acute Cholecystitis s/p cholecystostomy.  Per surgery, outpatient f/u for removal.  Continue Abx -> Rocephin and Flagyl until 3/10, can change to po on discharge.  Outpatient Surgeon  from [facility] where tube placed;   > Hypoxia - Acute on Chronic systolic congestive heart failure/ baseline EF 20-25%.  Lifevest.  Hx of cad s/p MI in dec 2018 - Hypoxia improved.  Continue Lasix, BBlocker.  > CAD - continue Plavix, BBlocker, Statin.   >TONY due to ATN, underlying CKD.  - Cr improving.  Discussed with Renal.  Continue Garcia as pt hs failed voiding trial.    >s/p Septic Shock - now off pressors.  BP stable.  Supportive care  > Atrial fibrillation - rate stable on Bblocker.  Eliquis  > Severe Protein Calorie Malnutrition - Continue Nepro supplements	  > physical deconditioning : Plan BISHOP.      Plan for BISHOP placement.  Another message left for daughter Luana ([phone])

## 2019-03-06 NOTE — PROGRESS NOTE ADULT - SUBJECTIVE AND OBJECTIVE BOX
85yo male s/p bedside cystoscopy and whitehead catheter placement 2/27/19.    Whitehead cath draining well, yellow urine    Pt remains afebrile, VSS

## 2019-03-06 NOTE — DISCHARGE NOTE ADULT - CARE PROVIDERS DIRECT ADDRESSES
,DirectAddress_Unknown,[email or web address]

## 2019-03-06 NOTE — DISCHARGE NOTE ADULT - HOSPITAL COURSE
Patient: MARIIA REY [id number]                 Internal Medicine Hospitalist Discharge Note    85 yo male PMH Afib (home on eliquis), CAD s/p PCI to LAD (Nov 2018), CHFrEF (EF 20-25%), life vest, BPH, HTN, HLD, [place] [facility] [place] [facility] acute cholecystitis.  HIDA showed probable acute cholecystitis and he was deemed a poor surgical candidate so he underwent percutaneous cholecystostomy tube placement on 2/25.  He had a brief episode of hypotension requiring dopamine, which precipitated afib with RVR, and the dopamine was subsequently discontinued.  Course complicated by oliguric renal failure.  [place] dante catheter placed on 2/26.  Transferred to [facility] on 2/27 due to worsening renal failure with anticipated need for CVVHD.   Schwanz dante catheter removed;  cholecystosomy tube still draining;   kidney functioning improving, patient downgraded to medical service.  Restarted on diuretics.  SaO2 now 94-98% on RA.          >Acute Cholecystitis s/p cholecystostomy.  Per surgery, outpatient f/u for removal.  Continue Abx -> Rocephin and Flagyl until 3/10, can change to po on discharge.  [place] Sur[place]  [place] [facility] [place];   > Hypoxia - Acute on Chronic systolic congestive heart failure/ baseline EF 20-25%.  Lifevest.  Hx of cad s/p MI in dec 2018 - Hypoxia improved.  Continue Lasix, BBlocker. Resume ACEI  > CAD - continue Plavix, BBlocker, Statin.   >TONY due to ATN, underlying CKD.  - Cr improving.  Discussed with Renal.  Continue Garcia as pt hs failed voiding trial.    >s/p Septic Shock - now off pressors.  BP stable.  Supportive care  > Atrial fibrillation - rate stable on Bblocker.  Eliquis  > Severe Protein CALorie Malnutrition - continue Nepro.      Disposition: stable for discharge.  Outpatient followup as above.  Patient was advised to return if they experience any recurrence or worsening of symptoms.  Total time spent on discharge was 35 minutes.  -Dieter Bey D.O., Hospitalist, [facility]

## 2019-03-06 NOTE — DISCHARGE NOTE ADULT - CARE PROVIDER_API CALL
Chance Aguilar)  Surgery  Phone: [phone]  Fax: [phone]  Follow Up Time:     Israel Maguire (MD)  Urology  [address], [address]  [place] [address]  Phone: [phone]  Fax: [phone]  Follow Up Time:

## 2019-03-06 NOTE — PROGRESS NOTE ADULT - ASSESSMENT
85 yo male with whitehead cath, renal function improved  - continue flomax  - d/c planning to [facility] as per medicine with whitehead cath

## 2019-03-17 ENCOUNTER — INBOUND DOCUMENT (OUTPATIENT)
Age: 84
End: 2019-03-17

## 2019-03-20 ENCOUNTER — APPOINTMENT (OUTPATIENT)
Dept: CARDIOLOGY | Facility: CLINIC | Age: 84
End: 2019-03-20

## 2019-03-21 PROBLEM — I50.20 UNSPECIFIED SYSTOLIC (CONGESTIVE) HEART FAILURE: Chronic | Status: ACTIVE | Noted: 2019-02-27

## 2019-03-21 PROBLEM — I48.91 UNSPECIFIED ATRIAL FIBRILLATION: Chronic | Status: ACTIVE | Noted: 2019-02-27

## 2019-03-21 PROBLEM — I25.10 ATHEROSCLEROTIC HEART DISEASE OF NATIVE CORONARY ARTERY WITHOUT ANGINA PECTORIS: Chronic | Status: ACTIVE | Noted: 2019-02-27

## 2019-03-22 ENCOUNTER — CHART COPY (OUTPATIENT)
Age: 84
End: 2019-03-22

## 2019-04-02 ENCOUNTER — APPOINTMENT (OUTPATIENT)
Dept: CARDIOLOGY | Facility: CLINIC | Age: 84
End: 2019-04-02

## 2019-04-09 ENCOUNTER — NON-APPOINTMENT (OUTPATIENT)
Age: 84
End: 2019-04-09

## 2019-04-09 ENCOUNTER — APPOINTMENT (OUTPATIENT)
Dept: ELECTROPHYSIOLOGY | Facility: CLINIC | Age: 84
End: 2019-04-09
Payer: MEDICARE

## 2019-04-09 VITALS
DIASTOLIC BLOOD PRESSURE: 70 MMHG | SYSTOLIC BLOOD PRESSURE: 130 MMHG | HEART RATE: 95 BPM | HEIGHT: 66 IN | BODY MASS INDEX: 28.12 KG/M2 | WEIGHT: 175 LBS

## 2019-04-09 DIAGNOSIS — I48.0 PAROXYSMAL ATRIAL FIBRILLATION: ICD-10-CM

## 2019-04-09 DIAGNOSIS — I50.9 HEART FAILURE, UNSPECIFIED: ICD-10-CM

## 2019-04-09 DIAGNOSIS — I45.10 UNSPECIFIED RIGHT BUNDLE-BRANCH BLOCK: ICD-10-CM

## 2019-04-09 DIAGNOSIS — I25.5 ISCHEMIC CARDIOMYOPATHY: ICD-10-CM

## 2019-04-09 PROCEDURE — 93000 ELECTROCARDIOGRAM COMPLETE: CPT

## 2019-04-09 PROCEDURE — 99204 OFFICE O/P NEW MOD 45 MIN: CPT

## 2019-04-09 RX ORDER — APIXABAN 5 MG/1
5 TABLET, FILM COATED ORAL
Qty: 180 | Refills: 3 | Status: DISCONTINUED | COMMUNITY
End: 2019-04-09

## 2019-04-09 RX ORDER — METOPROLOL TARTRATE 25 MG/1
25 TABLET, FILM COATED ORAL DAILY
Qty: 30 | Refills: 3 | Status: DISCONTINUED | COMMUNITY
End: 2019-04-09

## 2019-04-09 RX ORDER — FINASTERIDE 5 MG/1
5 TABLET, FILM COATED ORAL DAILY
Qty: 90 | Refills: 3 | Status: DISCONTINUED | COMMUNITY
End: 2019-04-09

## 2019-04-09 RX ORDER — CITALOPRAM HYDROBROMIDE 10 MG/1
10 TABLET, FILM COATED ORAL DAILY
Qty: 90 | Refills: 3 | Status: DISCONTINUED | COMMUNITY
End: 2019-04-09

## 2019-04-09 RX ORDER — ATORVASTATIN CALCIUM 40 MG/1
40 TABLET, FILM COATED ORAL
Qty: 90 | Refills: 1 | Status: DISCONTINUED | COMMUNITY
End: 2019-04-09

## 2019-04-09 RX ORDER — FUROSEMIDE 40 MG/1
40 TABLET ORAL
Qty: 91 | Refills: 0 | Status: DISCONTINUED | COMMUNITY
End: 2019-04-09

## 2019-04-09 RX ORDER — POTASSIUM CHLORIDE 750 MG/1
10 CAPSULE, EXTENDED RELEASE ORAL
Refills: 1 | Status: DISCONTINUED | COMMUNITY
End: 2019-04-09

## 2019-04-09 RX ORDER — PANTOPRAZOLE 40 MG/1
40 TABLET, DELAYED RELEASE ORAL DAILY
Qty: 90 | Refills: 0 | Status: DISCONTINUED | COMMUNITY
End: 2019-04-09

## 2019-04-23 ENCOUNTER — APPOINTMENT (OUTPATIENT)
Dept: CARDIOLOGY | Facility: CLINIC | Age: 84
End: 2019-04-23
Payer: MEDICARE

## 2019-04-23 VITALS
HEART RATE: 80 BPM | DIASTOLIC BLOOD PRESSURE: 60 MMHG | SYSTOLIC BLOOD PRESSURE: 100 MMHG | BODY MASS INDEX: 25.18 KG/M2 | WEIGHT: 170 LBS | HEIGHT: 69 IN | OXYGEN SATURATION: 95 %

## 2019-04-23 DIAGNOSIS — Z95.5 PRESENCE OF CORONARY ANGIOPLASTY IMPLANT AND GRAFT: ICD-10-CM

## 2019-04-23 DIAGNOSIS — I21.09 ST ELEVATION (STEMI) MYOCARDIAL INFARCTION INVOLVING OTHER CORONARY ARTERY OF ANTERIOR WALL: ICD-10-CM

## 2019-04-23 DIAGNOSIS — I50.42 CHRONIC COMBINED SYSTOLIC (CONGESTIVE) AND DIASTOLIC (CONGESTIVE) HEART FAILURE: ICD-10-CM

## 2019-04-23 DIAGNOSIS — Z79.899 OTHER LONG TERM (CURRENT) DRUG THERAPY: ICD-10-CM

## 2019-04-23 PROCEDURE — 99215 OFFICE O/P EST HI 40 MIN: CPT

## 2019-04-23 RX ORDER — METOPROLOL TARTRATE 25 MG/1
25 TABLET, FILM COATED ORAL
Refills: 0 | Status: ACTIVE | COMMUNITY

## 2019-04-23 RX ORDER — TAMSULOSIN HYDROCHLORIDE 0.4 MG/1
0.4 CAPSULE ORAL
Qty: 90 | Refills: 3 | Status: ACTIVE | COMMUNITY

## 2019-04-23 RX ORDER — LISINOPRIL 2.5 MG/1
2.5 TABLET ORAL DAILY
Refills: 0 | Status: ACTIVE | COMMUNITY

## 2019-04-23 RX ORDER — CITALOPRAM HYDROBROMIDE 20 MG/1
20 TABLET, FILM COATED ORAL DAILY
Refills: 0 | Status: ACTIVE | COMMUNITY

## 2019-04-23 RX ORDER — ATORVASTATIN CALCIUM 80 MG/1
80 TABLET, FILM COATED ORAL DAILY
Refills: 0 | Status: ACTIVE | COMMUNITY

## 2019-04-23 RX ORDER — FUROSEMIDE 40 MG/1
40 TABLET ORAL DAILY
Refills: 0 | Status: ACTIVE | COMMUNITY

## 2019-04-23 NOTE — REASON FOR VISIT
[Abnormal ECG] : an abnormal ECG [Follow-Up - Clinic] : a clinic follow-up of [Cardiomyopathy] : cardiomyopathy [Coronary Artery Disease] : coronary artery disease [Fatigue] : feeling tired (fatigue) [Heart Failure] : congestive heart failure [Other: _____] : [unfilled] [Hyperlipidemia] : hyperlipidemia

## 2019-04-23 NOTE — DISCUSSION/SUMMARY
[FreeTextEntry1] : The patient is an 84 year old male with HrEF secondary to ischemic cardiomyopathy. Patient was recommended to go see EP for possible ICD implantation, as he is wearing LifeVest now. He comes with a note from the nursing supervisor at [facility] that patient is no longer an ICD candidate and that there might be a desire to make him DNR and they want to know if it is OK to discontinue the LifeVest. Patient states he knows nothing about this. He is alert and oriented x 3. He seems forgetful at times, however but explains that he never had this discussion with anyone. I tried calling the patient's daughter, Luana, at number provided. There was no answer. I tried calling the nursing supervisor, Nicole, and there was no response. My recommendation to the patient was to set up a meeting with the nursing supervisor and his daughter so they can discuss end of life care and so that he can express his wishes to them. If he ends up deciding that he wants to be made DNR then it is OK to discontinue the LifeVest. Current medical therapy should remain the same. He is on high risk medications with no signs of toxicity.\par

## 2019-04-23 NOTE — HISTORY OF PRESENT ILLNESS
[FreeTextEntry1] : This is an 84 year old male history of MI, ischemic cardiomyopathy with EF on last echo 25% (1/2019) presents with a note from rehab explaining that daughter thinks patient should be DNR and desires to remove LifeVest. Patient denies any chest pain, SOB at rest or palpitations.

## 2019-04-23 NOTE — PHYSICAL EXAM
[Normal Appearance] : normal appearance [General Appearance - Well Developed] : well developed [General Appearance - Well Nourished] : well nourished [Well Groomed] : well groomed [General Appearance - In No Acute Distress] : no acute distress [No Deformities] : no deformities [Normal Conjunctiva] : the conjunctiva exhibited no abnormalities [Eyelids - No Xanthelasma] : the eyelids demonstrated no xanthelasmas [No Oral Pallor] : no oral pallor [Normal Oral Mucosa] : normal oral mucosa [No Oral Cyanosis] : no oral cyanosis [JVD Elevated _____cm] : JVD elevated [unfilled] ~U cm above clavicle [Normal Jugular Venous A Waves Present] : normal jugular venous A waves present [No Jugular Venous Sage A Waves] : no jugular venous sage A waves [Normal Jugular Venous V Waves Present] : normal jugular venous V waves present [Respiration, Rhythm And Depth] : normal respiratory rhythm and effort [Heart Sounds] : normal S1 and S2 [Auscultation Breath Sounds / Voice Sounds] : lungs were clear to auscultation bilaterally [Exaggerated Use Of Accessory Muscles For Inspiration] : no accessory muscle use [Murmurs] : no murmurs present [Irregularly Irregular] : the rhythm was irregularly irregular [Bowel Sounds] : normal bowel sounds [Abdomen Tenderness] : non-tender [Abdomen Soft] : soft [Abdomen Mass (___ Cm)] : no abdominal mass palpated [Nail Clubbing] : no clubbing of the fingernails [] : no ischemic changes [Petechial Hemorrhages (___cm)] : no petechial hemorrhages [Cyanosis, Localized] : no localized cyanosis [Mood] : the mood was normal [Affect] : the affect was normal [Oriented To Time, Place, And Person] : oriented to person, place, and time [No Anxiety] : not feeling anxious [FreeTextEntry1] : ecchymoses

## 2019-04-28 PROBLEM — I25.5 ISCHEMIC CARDIOMYOPATHY: Status: ACTIVE | Noted: 2018-12-05

## 2019-04-28 PROBLEM — I45.10 RBBB (RIGHT BUNDLE BRANCH BLOCK): Status: ACTIVE | Noted: 2019-02-22

## 2019-04-28 PROBLEM — I50.9 CHF (CONGESTIVE HEART FAILURE): Status: ACTIVE | Noted: 2018-12-05

## 2019-04-28 PROBLEM — I48.0 PAF (PAROXYSMAL ATRIAL FIBRILLATION): Status: ACTIVE | Noted: 2018-12-05

## 2019-04-28 NOTE — DISCUSSION/SUMMARY
[FreeTextEntry1] : He is s/p AWMI and stent 12/2018. Echo from Jan 2019 showed EF 24%\par He is on ACE, BB and Diuretic and BP marginal. Unable to up titrate his meds further\par Follow up echo pending. He is wearing LifeVest now. The patient is  alert and oriented x 3 but does not understand the ICD . He meets criteria for the ICD but concerned about his mental condition. Will discuss with medical team at the Facility and his family ( daughter). \par

## 2019-04-28 NOTE — HISTORY OF PRESENT ILLNESS
[FreeTextEntry1] : The patient is an 84 year old man with prior history of AWMI 12/2018, s/p stent placement 12/2018, ischemic cardiomyopathy with EF on last echo 25% (1/2019), HFrEF. He is currently wearing a life vest. He is here for evaluation for ICD. Patient is a poor Historian and offers no complaint. He denied syncope. He is taking his meds. It was noted that he has been SOB.  \par He is currentlty on Metoprolol, Lisinopril and Lasix

## 2019-04-28 NOTE — PHYSICAL EXAM
[General Appearance - Well Developed] : well developed [General Appearance - In No Acute Distress] : no acute distress [Normal Conjunctiva] : the conjunctiva exhibited no abnormalities [No Oral Pallor] : no oral pallor [No Oral Cyanosis] : no oral cyanosis [Normal Jugular Venous V Waves Present] : normal jugular venous V waves present [Heart Sounds] : normal S1 and S2 [Murmurs] : no murmurs present [Irregularly Irregular] : the rhythm was irregularly irregular [Respiration, Rhythm And Depth] : normal respiratory rhythm and effort [Auscultation Breath Sounds / Voice Sounds] : lungs were clear to auscultation bilaterally [Abdomen Soft] : soft [Abdomen Tenderness] : non-tender [Nail Clubbing] : no clubbing of the fingernails [Cyanosis, Localized] : no localized cyanosis [Petechial Hemorrhages (___cm)] : no petechial hemorrhages [] : no ischemic changes [No Skin Ulcers] : no skin ulcer [Oriented To Time, Place, And Person] : oriented to person, place, and time [FreeTextEntry1] : ecchymoses

## 2019-04-28 NOTE — REVIEW OF SYSTEMS
[Feeling Fatigued] : feeling fatigued [see HPI] : see HPI [Urinary Frequency] : urinary frequency [Negative] : Heme/Lymph [Cough] : no cough [Abdominal Pain] : no abdominal pain [Skin: A Rash] : no rash: [Dizziness] : no dizziness

## 2019-04-29 ENCOUNTER — APPOINTMENT (OUTPATIENT)
Dept: CARDIOLOGY | Facility: CLINIC | Age: 84
End: 2019-04-29

## 2022-07-31 NOTE — DISCHARGE NOTE ADULT - SMOKING EVEN A SINGLE PUFF INCREASES THE LIKELIHOOD OF A FULL RELAPSE, WITHDRAWAL SYMPTOMS PEAK WITHIN 1-2 WEEKS, BUT CAN PERSIST FOR MONTHS
1: 03 p m  07/31/2022-called patient to discuss results of PET scan    Left voicemail message as patient did not answer Statement Selected

## 2024-01-08 NOTE — H&P ADULT - PROBLEM SELECTOR PLAN 2
Name: Madhu Santoro ADMIT: 2024   : 1944  PCP: Damian Sanchez MD    MRN: [id number] LOS: 0 days   AGE/SEX: 79 y.o. male  ROOM: [facility]     Subjective   Subjective   Resting in bed.  No family currently at bedside.  He states that he returned home from rehab on Friday and by Friday night states that his legs were weak and heavy and gave out on him.  He fell hitting the left side of his head, but denies any associated loss of consciousness.  He states he has chronic dizziness with standing.  He denies any recent chest pain, dyspnea, cough, fever or chills.  Denies any recent nausea, vomiting or abdominal pain.  States that he has been getting his bolus feedings about 4 times a day and does take soft foods by mouth.     Objective   Objective   Vital Signs  Temp:  [97.7 °F (36.5 °C)-98.2 °F (36.8 °C)] 98.2 °F (36.8 °C)  Heart Rate:  [80-95] 92  Resp:  [18] 18  BP: (130-155)/(61-77) 144/72  SpO2:  [91 %-100 %] 97 %  on   ;   Device (Oxygen Therapy): room air  Body mass index is 25.23 kg/m².    Physical Exam  Vitals and nursing note reviewed.   Constitutional:       Appearance: He is ill-appearing. He is not toxic-appearing.   Cardiovascular:      Rate and Rhythm: Normal rate and regular rhythm.      Pulses: Normal pulses.   Pulmonary:      Effort: Pulmonary effort is normal. No respiratory distress.   Abdominal:      General: Bowel sounds are normal. There is no distension.      Palpations: Abdomen is soft.      Tenderness: There is no abdominal tenderness.      Comments: PEG present   Musculoskeletal:         General: Swelling (Mild bilateral lower extremities) present. Normal range of motion.      Cervical back: Normal range of motion and neck supple.   Skin:     General: Skin is warm and dry.      Findings: No bruising.   Neurological:      General: No focal deficit present.      Mental Status: He is alert and oriented to person, place, and time.      Sensory: No sensory deficit.      Motor:  Weakness present.      Coordination: Coordination normal.      Comments: Speech is a little soft and delayed   Psychiatric:         Mood and Affect: Mood normal.         Behavior: Behavior normal.       Results Review:       I reviewed the patient's new clinical results.  Results from last 7 days   Lab Units 01/08/24  0429 01/07/24  0928   WBC 10*3/mm3 10.15 13.52*   HEMOGLOBIN g/dL 10.4* 12.7*   PLATELETS 10*3/mm3 455* 498*     Results from last 7 days   Lab Units 01/08/24  0700 01/07/24  0928   SODIUM mmol/L 139 139   POTASSIUM mmol/L 3.9 4.3   CHLORIDE mmol/L 105 101   CO2 mmol/L 22.7 27.0   BUN mg/dL 13 11   CREATININE mg/dL 0.59* 0.66*   GLUCOSE mg/dL 123* 164*   Estimated Creatinine Clearance: 108.1 mL/min (A) (by C-G formula based on SCr of 0.59 mg/dL (L)).  Results from last 7 days   Lab Units 01/08/24  0700 01/07/24  0928   ALBUMIN g/dL 2.9* 3.8   BILIRUBIN mg/dL 0.3 0.3   ALK PHOS U/L 136* 153*   AST (SGOT) U/L 30 39   ALT (SGPT) U/L 17 17     Results from last 7 days   Lab Units 01/08/24  0700 01/07/24  0928   CALCIUM mg/dL 8.8 9.7   ALBUMIN g/dL 2.9* 3.8   MAGNESIUM mg/dL  --  2.0     Results from last 7 days   Lab Units 01/07/24  1227 01/07/24  0928   PROCALCITONIN ng/mL  --  0.13   LACTATE mmol/L 0.9  --      Glucose   Date/Time Value Ref Range Status   01/08/2024 1115 105 70 - 130 mg/dL Final   01/08/2024 0550 96 70 - 130 mg/dL Final   01/08/2024 0028 113 70 - 130 mg/dL Final   01/07/2024 2052 95 70 - 130 mg/dL Final   01/07/2024 1643 87 70 - 130 mg/dL Final       amitriptyline, 50 mg, Per G Tube, Nightly  amLODIPine, 5 mg, Per G Tube, Daily  apixaban, 2.5 mg, Per G Tube, Q12H  [START ON 1/31/2024] cyanocobalamin, 1,000 mcg, Intramuscular, Q30 Days  HYDROcodone-acetaminophen, 1 tablet, Per G Tube, TID  lansoprazole, 30 mg, Per G Tube, BID AC  Menthol-Zinc Oxide, 1 application , Topical, Q12H  rosuvastatin, 20 mg, Per G Tube, Nightly  sodium chloride, 10 mL, Intravenous, Q12H  sucralfate, 1 g, Per G  Tube, BID AC         NPO Diet NPO Type: Strict NPO       Assessment/Plan     Active Hospital Problems    Diagnosis  POA    **Orthostatic dizziness [R42]  Yes    Oral phase dysphagia [R13.11]  Yes    History of CVA (cerebrovascular accident) [Z86.73]  Not Applicable    S/P percutaneous endoscopic gastrostomy (PEG) tube placement [Z93.1]  Not Applicable    Polycythemia vera [D45]  Yes    Iron deficiency anemia [D50.9]  Yes    Chronic anticoagulation [Z79.01]  Not Applicable    S/P ablation of atrial flutter [Z98.890, Z86.79]  Not Applicable    Typical atrial flutter [I48.3]  Yes    Gastroesophageal reflux disease [K21.9]  Yes    DDD (degenerative disc disease), 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     Mr. Santoro is a 79 y.o. male that presented to the hospital for weakness and falls.  He was recently admitted to this facility at the end of November when he was found to have weakness and worsening anemia.  He was found to have a GI bleed most likely related to recent PEG tube placement and related anticoagulation with Eliquis.  He was maintained on a lower dose of Eliquis at that point as family did not want to pursue endoscopy.  He was noted to have a decubitus ulcer as well as dysphagia.  He was maintained on bolus feeds and discharged to SNF for strengthening.  He was to follow-up with Dr. Daly with hematology given his history of polycythemia.  He was apparently discharged from SNF 1/5 and returned home with family.  He continued to be weak and having issues ambulating with a walker.  His legs gave out on him and he suffered a mechanical fall hitting the left side of his head.  He was noted to have some orthostatic hypotension and admitted for further evaluation.    Orthostatic dizziness  Mechanical fall  -Given IV hydration overnight.  Will  monitor positional blood pressures.  -Sounds like he has some chronicity to dizziness with position changes.  Recommend safety precautions.  -May benefit from compression hose.  -CT head stable from prior with no acute findings or traumatic etiology.  -PT/OT to see.  May likely need long-term care at this point.    Dysphagia  PEG tube in place  History of CVA  -Chest x-ray on admission negative for acute findings.  -Neurologically intact.  Will monitor.  -Continue low-dose Eliquis and statin.  Aspirin stopped last admission due to anemia and possible PEG bleeding.  -Continue tube feedings from prior.  -Aspiration precautions    Iron-deficiency anemia  Polycythemia vera  -With recent GI bleeding.  Continue PPI Carafate.  -Hemoglobin much improved from prior.  Will trend.  -Continue outpatient follow-up with Dr. Daly with hematology.    CAD  Hypertension  History of atrial fibrillation with ablation  -Followed by [facility] in the past.  -Continue low-dose Eliquis and statin.  Continue Norvasc.  -Monitor for any cardiac complaints.  -Monitor on telemetry    Discussed with patient, nursing staff and Dr. Chan.    Seems to be overall placement issue at this point.  Will reduce IV fluid rate to 50 cc while awaiting tube feeds to be restarted.  Then can likely saline lock.  Await PT/OT evaluation and CCP assistance    VTE Prophylaxis - Eliquis (home med)  Code Status - NO CPR/limited per admitting provider.  Disposition - Anticipate discharge TBD. Likely ready by tomorrow from medical standpoint.      MY Mondragon  [facility]  01/08/24  13:41 EST   Holding ace and diuretic due to renal failure.

## 2024-07-09 NOTE — PHYSICAL THERAPY INITIAL EVALUATION ADULT - CRITERIA FOR SKILLED THERAPEUTIC INTERVENTIONS
impairments found/[facility] vs Home with 24/7 assist, home PT, RW (pending progress)/anticipated discharge recommendation
yes

## 2024-08-15 NOTE — PATIENT PROFILE ADULT - IS PATIENT PREGNANT?
Nerve Block    Date/Time: 8/15/2024 9:29 AM    Performed by: Farrah Weinstein CRNA  Authorized by: Emre Vasquez DO    Block Type: adductor canal  Indication: post-op pain management at surgeon's request    Preanesthetic Checklist Patient Identified (2 criteria), Block Plan Confirmed, Resuscitation Equipment Available, Supplemental O2 (if needed), Allergies Confirmed, Block Site Marked (if applicable), Monitors Applied, Aseptic Technique, Coagulation Status, Necessary Block Equipment Present, Timeout Performed, IV Access Functioning, Consent Verified, Drugs/Solutions Labeled and Sedation Given (if needed)    Patient Position:  Supine  Prep:  Chlorhexidine gluconate (CHG)  Max Sterile Barrier Technique:  Hand washing, cap/mask, sterile gloves, sterile probe cover and sterile gel  Monitoring:  Continuous pulse oximetry, EKG and blood pressure  Injection Technique:  Single-shot  Procedures: ultrasound guided and ultrasound permanent image saved    Local Infiltration:  Lidocaine  Needle Type:  Stimulating Tuohy  Needle Gauge:  22 G  Needle Length:  8 cm  Needle Localization:  Ultrasound guidance and anatomical landmarks   in-plane  Physical status during block:  Awake  Medications Administered  ropivacaine (NAROPIN) 0.5 % - Infiltration   30 mL - 8/15/2024 9:35:00 AM  Injection Assessment:  No paresthesia on injection, no resistance to injection, negative aspiration for heme, incremental injection and local visualized surrounding nerve on ultrasound  Patient Condition:  Tolerated well, no immediate complications  Paresthesia Pain:  None  Heart Rate Change: No    Slowly Injected: Yes    Start Time:8/15/2024 9:29 AM  Stop Time: 8/15/2024 9:35 AM   10 ml at adductor, 10 ml vastus medialis, 10 ml anterior cutaneous       
Spinal Block    Date/Time: 8/15/2024 10:03 AM    Performed by: Farrah Weinstein CRNA  Authorized by: Emre Vasquez DO    Patient Location:  OR  Indication: primary anesthetic    Pre anesthesia checklist Patient Identified (2 criteria), Timeout Performed, Resuscitation equipment available, IV Bolus, Allergies confirmed, Monitors applied, Coagulation Status, Block site marked (if applicable), Sedation given (if needed), Block Plan Confirmed, Drugs/Solutions Labeled, IV Access functioning, Necessary Block Equipment Present, Consent Verified and Aseptic technique   Patient Position:  Sitting  Prep:  Chlorhexidine gluconate (CHG)  Max Sterile Barrier Technique:  Hand washing, cap/mask, sterile gloves and sterile drape  Monitoring:  Continuous pulse ox, EKG and NIBP  Oxygen Supplement:  Room Air  Approach:  Midline  Local Infiltration:  1% Lidocaine  Location:  L4-5  Injection Technique:  Single-shot  Needle Type:  Pete  Needle Gauge:  25 G  Needle Length:  5 in  Medications Administered  bupivacaine 0.75% in dextrose 8.25% - Intrathecal   1.6 mL - 8/15/2024 10:09:00 AM    Assessment:   Number of Attempts: 1  Start Time:  8/15/2024 10:03 AM  Stop Time: 8/15/2024 10:09 AM      
no